# Patient Record
Sex: MALE | Race: WHITE | NOT HISPANIC OR LATINO | URBAN - METROPOLITAN AREA
[De-identification: names, ages, dates, MRNs, and addresses within clinical notes are randomized per-mention and may not be internally consistent; named-entity substitution may affect disease eponyms.]

---

## 2017-06-13 ENCOUNTER — OFFICE VISIT - HEALTHEAST (OUTPATIENT)
Dept: VASCULAR SURGERY | Facility: CLINIC | Age: 77
End: 2017-06-13

## 2017-06-13 DIAGNOSIS — L97.329 VENOUS STASIS ULCER OF ANKLE, LEFT (H): ICD-10-CM

## 2017-06-13 DIAGNOSIS — I83.023 VENOUS STASIS ULCER OF ANKLE, LEFT (H): ICD-10-CM

## 2017-06-20 ENCOUNTER — OFFICE VISIT - HEALTHEAST (OUTPATIENT)
Dept: VASCULAR SURGERY | Facility: CLINIC | Age: 77
End: 2017-06-20

## 2017-06-20 DIAGNOSIS — L97.329 VENOUS STASIS ULCER OF ANKLE, LEFT (H): ICD-10-CM

## 2017-06-20 DIAGNOSIS — I83.023 VENOUS STASIS ULCER OF ANKLE, LEFT (H): ICD-10-CM

## 2017-06-27 ENCOUNTER — OFFICE VISIT - HEALTHEAST (OUTPATIENT)
Dept: VASCULAR SURGERY | Facility: CLINIC | Age: 77
End: 2017-06-27

## 2017-06-27 DIAGNOSIS — I83.023 VENOUS STASIS ULCER OF ANKLE, LEFT (H): ICD-10-CM

## 2017-06-27 DIAGNOSIS — L97.329 VENOUS STASIS ULCER OF ANKLE, LEFT (H): ICD-10-CM

## 2017-07-11 ENCOUNTER — OFFICE VISIT - HEALTHEAST (OUTPATIENT)
Dept: VASCULAR SURGERY | Facility: CLINIC | Age: 77
End: 2017-07-11

## 2017-07-11 DIAGNOSIS — I83.023 VENOUS STASIS ULCER OF ANKLE, LEFT (H): ICD-10-CM

## 2017-07-11 DIAGNOSIS — L97.329 VENOUS STASIS ULCER OF ANKLE, LEFT (H): ICD-10-CM

## 2017-08-29 ENCOUNTER — OFFICE VISIT - HEALTHEAST (OUTPATIENT)
Dept: VASCULAR SURGERY | Facility: CLINIC | Age: 77
End: 2017-08-29

## 2017-08-29 DIAGNOSIS — L97.909 VENOUS STASIS ULCER OF LOWER EXTREMITY (H): ICD-10-CM

## 2017-08-29 DIAGNOSIS — I87.8 VENOUS STASIS: ICD-10-CM

## 2017-08-29 DIAGNOSIS — I83.009 VENOUS STASIS ULCER OF LOWER EXTREMITY (H): ICD-10-CM

## 2017-09-05 ENCOUNTER — OFFICE VISIT - HEALTHEAST (OUTPATIENT)
Dept: VASCULAR SURGERY | Facility: CLINIC | Age: 77
End: 2017-09-05

## 2017-09-05 DIAGNOSIS — L97.929 VENOUS STASIS ULCERS OF BOTH LOWER EXTREMITIES (H): ICD-10-CM

## 2017-09-05 DIAGNOSIS — I83.029 VENOUS STASIS ULCERS OF BOTH LOWER EXTREMITIES (H): ICD-10-CM

## 2017-09-05 DIAGNOSIS — I83.019 VENOUS STASIS ULCERS OF BOTH LOWER EXTREMITIES (H): ICD-10-CM

## 2017-09-05 DIAGNOSIS — L97.919 VENOUS STASIS ULCERS OF BOTH LOWER EXTREMITIES (H): ICD-10-CM

## 2017-09-08 ENCOUNTER — COMMUNICATION - HEALTHEAST (OUTPATIENT)
Dept: VASCULAR SURGERY | Facility: CLINIC | Age: 77
End: 2017-09-08

## 2017-10-10 ENCOUNTER — OFFICE VISIT - HEALTHEAST (OUTPATIENT)
Dept: VASCULAR SURGERY | Facility: CLINIC | Age: 77
End: 2017-10-10

## 2017-10-10 DIAGNOSIS — I83.019 VENOUS STASIS ULCERS OF BOTH LOWER EXTREMITIES (H): ICD-10-CM

## 2017-10-10 DIAGNOSIS — L97.919 VENOUS STASIS ULCERS OF BOTH LOWER EXTREMITIES (H): ICD-10-CM

## 2017-10-10 DIAGNOSIS — L97.929 VENOUS STASIS ULCERS OF BOTH LOWER EXTREMITIES (H): ICD-10-CM

## 2017-10-10 DIAGNOSIS — I83.029 VENOUS STASIS ULCERS OF BOTH LOWER EXTREMITIES (H): ICD-10-CM

## 2019-07-03 ENCOUNTER — COMMUNICATION - HEALTHEAST (OUTPATIENT)
Dept: VASCULAR SURGERY | Facility: CLINIC | Age: 79
End: 2019-07-03

## 2019-07-12 ENCOUNTER — RECORDS - HEALTHEAST (OUTPATIENT)
Dept: ADMINISTRATIVE | Facility: OTHER | Age: 79
End: 2019-07-12

## 2019-07-12 ENCOUNTER — RECORDS - HEALTHEAST (OUTPATIENT)
Dept: VASCULAR ULTRASOUND | Facility: CLINIC | Age: 79
End: 2019-07-12

## 2019-07-12 ENCOUNTER — COMMUNICATION - HEALTHEAST (OUTPATIENT)
Dept: VASCULAR SURGERY | Facility: CLINIC | Age: 79
End: 2019-07-12

## 2019-07-12 ENCOUNTER — OFFICE VISIT - HEALTHEAST (OUTPATIENT)
Dept: VASCULAR SURGERY | Facility: CLINIC | Age: 79
End: 2019-07-12

## 2019-07-12 DIAGNOSIS — E11.621 TYPE 2 DIABETES MELLITUS WITH FOOT ULCER (CODE) (H): ICD-10-CM

## 2019-07-12 DIAGNOSIS — L03.119 CELLULITIS AND ABSCESS OF FOOT: ICD-10-CM

## 2019-07-12 DIAGNOSIS — L97.524 NON-PRESSURE CHRONIC ULCER OF OTHER PART OF LEFT FOOT WITH NECROSIS OF BONE (H): ICD-10-CM

## 2019-07-12 DIAGNOSIS — E11.621 DIABETIC ULCER OF TOE OF LEFT FOOT ASSOCIATED WITH TYPE 2 DIABETES MELLITUS, WITH NECROSIS OF BONE (H): ICD-10-CM

## 2019-07-12 DIAGNOSIS — L02.619 CELLULITIS AND ABSCESS OF FOOT: ICD-10-CM

## 2019-07-12 DIAGNOSIS — L97.524 DIABETIC ULCER OF TOE OF LEFT FOOT ASSOCIATED WITH TYPE 2 DIABETES MELLITUS, WITH NECROSIS OF BONE (H): ICD-10-CM

## 2019-07-12 ASSESSMENT — MIFFLIN-ST. JEOR: SCORE: 1796.27

## 2019-07-17 ENCOUNTER — COMMUNICATION - HEALTHEAST (OUTPATIENT)
Dept: VASCULAR SURGERY | Facility: CLINIC | Age: 79
End: 2019-07-17

## 2019-07-18 ENCOUNTER — HOSPITAL ENCOUNTER (OUTPATIENT)
Dept: NUCLEAR MEDICINE | Facility: HOSPITAL | Age: 79
Discharge: HOME | End: 2019-07-18
Attending: PODIATRIST

## 2019-07-18 DIAGNOSIS — E11.621 DIABETIC ULCER OF TOE OF LEFT FOOT ASSOCIATED WITH TYPE 2 DIABETES MELLITUS, WITH NECROSIS OF BONE (H): ICD-10-CM

## 2019-07-18 DIAGNOSIS — L97.524 DIABETIC ULCER OF TOE OF LEFT FOOT ASSOCIATED WITH TYPE 2 DIABETES MELLITUS, WITH NECROSIS OF BONE (H): ICD-10-CM

## 2019-07-25 ENCOUNTER — HOSPITAL ENCOUNTER (OUTPATIENT)
Dept: NUCLEAR MEDICINE | Facility: HOSPITAL | Age: 79
Discharge: HOME | End: 2019-07-25
Attending: PODIATRIST

## 2019-07-25 ENCOUNTER — HOSPITAL ENCOUNTER (OUTPATIENT)
Dept: RADIOLOGY | Facility: HOSPITAL | Age: 79
Discharge: HOME | End: 2019-07-25
Attending: PODIATRIST

## 2019-07-25 DIAGNOSIS — E11.621 DIABETIC ULCER OF TOE OF LEFT FOOT ASSOCIATED WITH TYPE 2 DIABETES MELLITUS, WITH NECROSIS OF BONE (H): ICD-10-CM

## 2019-07-25 DIAGNOSIS — L97.524 DIABETIC ULCER OF TOE OF LEFT FOOT ASSOCIATED WITH TYPE 2 DIABETES MELLITUS, WITH NECROSIS OF BONE (H): ICD-10-CM

## 2019-07-26 ENCOUNTER — SURGERY - HEALTHEAST (OUTPATIENT)
Dept: PODIATRY | Facility: CLINIC | Age: 79
End: 2019-07-26

## 2019-07-26 ENCOUNTER — COMMUNICATION - HEALTHEAST (OUTPATIENT)
Dept: VASCULAR SURGERY | Facility: CLINIC | Age: 79
End: 2019-07-26

## 2019-07-26 ENCOUNTER — OFFICE VISIT - HEALTHEAST (OUTPATIENT)
Dept: VASCULAR SURGERY | Facility: CLINIC | Age: 79
End: 2019-07-26

## 2019-07-26 DIAGNOSIS — M86.9 OSTEOMYELITIS OF ANKLE AND FOOT (H): ICD-10-CM

## 2019-07-26 DIAGNOSIS — E11.621 DIABETIC ULCER OF TOE OF LEFT FOOT ASSOCIATED WITH TYPE 2 DIABETES MELLITUS, WITH NECROSIS OF BONE (H): ICD-10-CM

## 2019-07-26 DIAGNOSIS — L97.524 DIABETIC ULCER OF TOE OF LEFT FOOT ASSOCIATED WITH TYPE 2 DIABETES MELLITUS, WITH NECROSIS OF BONE (H): ICD-10-CM

## 2019-07-29 ENCOUNTER — RECORDS - HEALTHEAST (OUTPATIENT)
Dept: ADMINISTRATIVE | Facility: OTHER | Age: 79
End: 2019-07-29

## 2019-07-29 ENCOUNTER — COMMUNICATION - HEALTHEAST (OUTPATIENT)
Dept: VASCULAR SURGERY | Facility: CLINIC | Age: 79
End: 2019-07-29

## 2019-07-30 ASSESSMENT — MIFFLIN-ST. JEOR: SCORE: 1896.06

## 2019-07-31 ENCOUNTER — ANESTHESIA - HEALTHEAST (OUTPATIENT)
Dept: SURGERY | Facility: HOSPITAL | Age: 79
End: 2019-07-31

## 2019-07-31 ENCOUNTER — DOCUMENTATION ONLY (OUTPATIENT)
Dept: OTHER | Facility: CLINIC | Age: 79
End: 2019-07-31

## 2019-07-31 ENCOUNTER — SURGERY - HEALTHEAST (OUTPATIENT)
Dept: SURGERY | Facility: HOSPITAL | Age: 79
End: 2019-07-31

## 2019-07-31 ENCOUNTER — AMBULATORY - HEALTHEAST (OUTPATIENT)
Dept: OTHER | Facility: CLINIC | Age: 79
End: 2019-07-31

## 2019-08-02 ENCOUNTER — COMMUNICATION - HEALTHEAST (OUTPATIENT)
Dept: VASCULAR SURGERY | Facility: CLINIC | Age: 79
End: 2019-08-02

## 2019-08-07 ENCOUNTER — OFFICE VISIT - HEALTHEAST (OUTPATIENT)
Dept: VASCULAR SURGERY | Facility: CLINIC | Age: 79
End: 2019-08-07

## 2019-08-07 DIAGNOSIS — E11.621 DIABETIC ULCER OF TOE OF LEFT FOOT ASSOCIATED WITH TYPE 2 DIABETES MELLITUS, WITH NECROSIS OF BONE (H): ICD-10-CM

## 2019-08-07 DIAGNOSIS — L97.524 DIABETIC ULCER OF TOE OF LEFT FOOT ASSOCIATED WITH TYPE 2 DIABETES MELLITUS, WITH NECROSIS OF BONE (H): ICD-10-CM

## 2019-08-07 DIAGNOSIS — M86.172 OTHER ACUTE OSTEOMYELITIS OF LEFT FOOT (H): ICD-10-CM

## 2019-08-08 ENCOUNTER — COMMUNICATION - HEALTHEAST (OUTPATIENT)
Dept: VASCULAR SURGERY | Facility: CLINIC | Age: 79
End: 2019-08-08

## 2019-08-22 ENCOUNTER — OFFICE VISIT - HEALTHEAST (OUTPATIENT)
Dept: INFECTIOUS DISEASES | Facility: CLINIC | Age: 79
End: 2019-08-22

## 2019-08-22 ENCOUNTER — COMMUNICATION - HEALTHEAST (OUTPATIENT)
Dept: INFECTIOUS DISEASES | Facility: CLINIC | Age: 79
End: 2019-08-22

## 2019-08-22 DIAGNOSIS — M86.00 ACUTE HEMATOGENOUS OSTEOMYELITIS, UNSPECIFIED SITE (H): ICD-10-CM

## 2019-08-22 DIAGNOSIS — L97.524 DIABETIC ULCER OF TOE OF LEFT FOOT ASSOCIATED WITH TYPE 2 DIABETES MELLITUS, WITH NECROSIS OF BONE (H): ICD-10-CM

## 2019-08-22 DIAGNOSIS — E11.621 DIABETIC ULCER OF TOE OF LEFT FOOT ASSOCIATED WITH TYPE 2 DIABETES MELLITUS, WITH NECROSIS OF BONE (H): ICD-10-CM

## 2019-09-04 ENCOUNTER — OFFICE VISIT - HEALTHEAST (OUTPATIENT)
Dept: VASCULAR SURGERY | Facility: CLINIC | Age: 79
End: 2019-09-04

## 2019-09-04 DIAGNOSIS — L97.524 SKIN ULCER OF LEFT FOOT WITH NECROSIS OF BONE (H): ICD-10-CM

## 2019-09-04 ASSESSMENT — MIFFLIN-ST. JEOR: SCORE: 1873.38

## 2019-09-25 ENCOUNTER — OFFICE VISIT - HEALTHEAST (OUTPATIENT)
Dept: VASCULAR SURGERY | Facility: CLINIC | Age: 79
End: 2019-09-25

## 2019-09-25 DIAGNOSIS — L97.524 SKIN ULCER OF LEFT FOOT WITH NECROSIS OF BONE (H): ICD-10-CM

## 2019-09-25 ASSESSMENT — MIFFLIN-ST. JEOR: SCORE: 1873.38

## 2019-10-23 ENCOUNTER — OFFICE VISIT - HEALTHEAST (OUTPATIENT)
Dept: VASCULAR SURGERY | Facility: CLINIC | Age: 79
End: 2019-10-23

## 2019-10-23 DIAGNOSIS — L97.524 SKIN ULCER OF LEFT FOOT WITH NECROSIS OF BONE (H): ICD-10-CM

## 2019-10-23 ASSESSMENT — MIFFLIN-ST. JEOR: SCORE: 1873.38

## 2020-09-09 ENCOUNTER — OFFICE VISIT - HEALTHEAST (OUTPATIENT)
Dept: VASCULAR SURGERY | Facility: CLINIC | Age: 80
End: 2020-09-09

## 2020-09-09 DIAGNOSIS — L97.511 VENOUS STASIS ULCER OF OTHER PART OF RIGHT FOOT LIMITED TO BREAKDOWN OF SKIN WITH VARICOSE VEINS (H): ICD-10-CM

## 2020-09-09 DIAGNOSIS — L97.521 FOOT ULCERATION, LEFT, LIMITED TO BREAKDOWN OF SKIN (H): ICD-10-CM

## 2020-09-09 DIAGNOSIS — I83.015 VENOUS STASIS ULCER OF OTHER PART OF RIGHT FOOT LIMITED TO BREAKDOWN OF SKIN WITH VARICOSE VEINS (H): ICD-10-CM

## 2020-09-30 ENCOUNTER — OFFICE VISIT - HEALTHEAST (OUTPATIENT)
Dept: VASCULAR SURGERY | Facility: CLINIC | Age: 80
End: 2020-09-30

## 2020-09-30 DIAGNOSIS — Z86.718 HISTORY OF DVT (DEEP VEIN THROMBOSIS): ICD-10-CM

## 2020-09-30 DIAGNOSIS — I80.3 PHLEBITIS AND THROMBOPHLEBITIS OF LOWER EXTREMITIES: ICD-10-CM

## 2020-09-30 DIAGNOSIS — I87.8 VENOUS STASIS: ICD-10-CM

## 2020-09-30 DIAGNOSIS — I87.2 VENOUS INSUFFICIENCY OF BOTH LOWER EXTREMITIES: ICD-10-CM

## 2020-09-30 DIAGNOSIS — B95.8 STAPH INFECTION: ICD-10-CM

## 2020-09-30 DIAGNOSIS — I83.018: ICD-10-CM

## 2020-09-30 DIAGNOSIS — L90.5 SCAR CONDITION AND FIBROSIS OF SKIN: ICD-10-CM

## 2020-09-30 DIAGNOSIS — I83.023 VENOUS STASIS ULCER OF ANKLE, LEFT (H): ICD-10-CM

## 2020-09-30 DIAGNOSIS — L97.329 VENOUS STASIS ULCER OF ANKLE, LEFT (H): ICD-10-CM

## 2020-09-30 DIAGNOSIS — R60.9 DEPENDENT EDEMA: ICD-10-CM

## 2020-09-30 DIAGNOSIS — L97.812: ICD-10-CM

## 2020-10-01 ENCOUNTER — COMMUNICATION - HEALTHEAST (OUTPATIENT)
Dept: VASCULAR SURGERY | Facility: CLINIC | Age: 80
End: 2020-10-01

## 2020-10-03 LAB
BACTERIA SPEC CULT: ABNORMAL
GRAM STAIN RESULT: ABNORMAL
GRAM STAIN RESULT: ABNORMAL

## 2020-10-05 ENCOUNTER — COMMUNICATION - HEALTHEAST (OUTPATIENT)
Dept: VASCULAR SURGERY | Facility: CLINIC | Age: 80
End: 2020-10-05

## 2020-10-07 ENCOUNTER — COMMUNICATION - HEALTHEAST (OUTPATIENT)
Dept: VASCULAR SURGERY | Facility: CLINIC | Age: 80
End: 2020-10-07

## 2020-10-16 ENCOUNTER — COMMUNICATION - HEALTHEAST (OUTPATIENT)
Dept: VASCULAR SURGERY | Facility: CLINIC | Age: 80
End: 2020-10-16

## 2020-10-28 ENCOUNTER — OFFICE VISIT - HEALTHEAST (OUTPATIENT)
Dept: VASCULAR SURGERY | Facility: CLINIC | Age: 80
End: 2020-10-28

## 2020-10-28 DIAGNOSIS — L97.812: ICD-10-CM

## 2020-10-28 DIAGNOSIS — L97.329 VENOUS STASIS ULCER OF ANKLE, LEFT (H): ICD-10-CM

## 2020-10-28 DIAGNOSIS — I83.023 VENOUS STASIS ULCER OF ANKLE, LEFT (H): ICD-10-CM

## 2020-10-28 DIAGNOSIS — L97.511 VENOUS STASIS ULCER OF OTHER PART OF RIGHT FOOT LIMITED TO BREAKDOWN OF SKIN WITH VARICOSE VEINS (H): ICD-10-CM

## 2020-10-28 DIAGNOSIS — I83.015 VENOUS STASIS ULCER OF OTHER PART OF RIGHT FOOT LIMITED TO BREAKDOWN OF SKIN WITH VARICOSE VEINS (H): ICD-10-CM

## 2020-10-28 DIAGNOSIS — Z86.718 HISTORY OF DVT (DEEP VEIN THROMBOSIS): ICD-10-CM

## 2020-10-28 DIAGNOSIS — I87.8 VENOUS STASIS: ICD-10-CM

## 2020-10-28 DIAGNOSIS — I80.3 PHLEBITIS AND THROMBOPHLEBITIS OF LOWER EXTREMITIES: ICD-10-CM

## 2020-10-28 DIAGNOSIS — R60.9 DEPENDENT EDEMA: ICD-10-CM

## 2020-10-28 DIAGNOSIS — I83.018: ICD-10-CM

## 2020-10-28 DIAGNOSIS — L90.5 SCAR CONDITION AND FIBROSIS OF SKIN: ICD-10-CM

## 2020-10-28 DIAGNOSIS — I87.2 VENOUS INSUFFICIENCY OF BOTH LOWER EXTREMITIES: ICD-10-CM

## 2020-12-15 DIAGNOSIS — R80.1 PERSISTENT PROTEINURIA: Primary | ICD-10-CM

## 2021-05-30 NOTE — TELEPHONE ENCOUNTER
Dr. Vargas's office called back. Spoke to Charisse. She states that Dr. Vargas was notified and there will be no bridging needed since he held longer than 48hrs and pt should continue hold eliquis until surgery since it is in 2 days.

## 2021-05-30 NOTE — TELEPHONE ENCOUNTER
Phuong reached out to Dr. Vargas's about xarelto bridging prior to surgery on 7/31/19. Yuliana the triage nurse called and stated that Dr. Vargas is out today. She will send the message to another provider in the office. Phuong also stated that she has message to the pts cardiology.

## 2021-05-30 NOTE — TELEPHONE ENCOUNTER
Patient would like to see a provider for a wound on his foot sooner than 7/23/19.  Informed him of 7/19/19 opening but he is unable to make that.  Patient was put on a waiting list.  Pt stated understanding to go to the emergency room if he thinks the wound is getting worse.  Informed him to put a red marker around the wound site, if it increases he should go in.    Patient was put on an antibiotic by another provider for the wound starting on 7/3/19

## 2021-05-30 NOTE — TELEPHONE ENCOUNTER
Patient confirmed all testing @ [facility] for 7/18 and 7/22/19 mailed AVS and all info to patient

## 2021-05-30 NOTE — PATIENT INSTRUCTIONS - HE
Important Instructions     *     Stop downstairs to have an X-ray of your foot taken    * We will go over your test results at your next office visit    * make an appointment for your Bone Scan                      How to care for your wound    Cleanse wound with saline sent to you with your supplies or a wound wash found at the pharmacy.    *   Apply Silvadene & bacitracin to the wound    *   Cover the wound with a dry gauze dressing    *   Change dressing: every day    *   Do not get your ulcer wet when you shower or take a bath.  You can cover it with cling wrap, a bag taped to your skin or a cast protector.    *   Good nutrition is important for wound healing.  I recommend increasing your protein.  You can do this through your diet, nutritional supplements, and/or protein powder.      Please call [facility] before substituting any product    Call our clinic nurse at [phone] if there is an increase in drainage, pain, redness, or the wound size increases.  This maybe a sign of infection and require attention prior to the next appointment

## 2021-05-30 NOTE — TELEPHONE ENCOUNTER
Called patient and notified him of eliquis hold. Patient states that he has held medication since Friday-7/26. Surgery set for 7/31/19. He states that in the past he has had to hold his coumadin for 5 days prior to surgery and so he did the same for eliquis.     Notified PCP clinic. Spoke to Andres, triage nurse. Notified that patient has actually stopped eliquis for 4days now, unsure if they want to do a day of bridging or continue as is since surgery is in 2 days. Nurse will address this with Dr. Vargas.

## 2021-05-30 NOTE — TELEPHONE ENCOUNTER
Pt called with questions about tests. Reviewed everything with him.   Xray and Bone scan tomorrow and a 2nd bone scan on Monday.

## 2021-05-30 NOTE — PROGRESS NOTES
Surgery/Procedure: I&D with bone biopsy    Special Equipment: pulse lavage    Location: [facility]    Date: 7/31/19    Time: 10am    Surgeon: Dr. Cooper    Assist: none    Length of Surgery: 45 minutes    OR Confirmed/ :  Yes with Yesenia on 7/26/19    Orders In:  Yes    Provider Team Notified:  Yes    Entered on [employer] / [email or web address] Calendar:  Yes    Post Op: 8/7/19    Discussed with patient they he will need to hold Eliquis 48 hours before surgery. Called primary and Cardiology regarding any needs for bridging. Awaiting return call.

## 2021-05-30 NOTE — TELEPHONE ENCOUNTER
Primary clinic left message that patient can hold his eliquis for 48hrs prior to surgery and resume right after unless unusual bleeding is noted. Dr Ahsan Ramsey who was on-call approved this.     Writer left message to return phone call.

## 2021-05-30 NOTE — PROGRESS NOTES
FOOT AND ANKLE SURGERY/PODIATRY Progress Note      ASSESSMENT:   Osteomyelitis 2nd digit left foot  Ulceration 2nd digit left foot  DM2      TREATMENT:  -3 phase bone scan report indicates osteomyelitis of the 2nd digit left foot. Due to the presence of osteomyelitis, I reviewed the treatment options to include either 6 weeks IV antibiotics with Infectious Disease vs amputation of the involved bone. He would like to proceed with antibiotic treatment at this time. Discussed obtaining a bone biopsy in the OR setting with use of a wound vac after starting antibiotic therapy per ID. Also reviewed that amputation may be necessary if wound fails to resolve.     -After discussion of risk factors and consent obtained 2% Lidocaine HCL jelly was applied, under clean conditions, the left and foot ulceration(s) were debrided using currette.  Devitalized and nonviable tissue, along with any fibrin and slough, was removed to improve granulation tissue formation, stimulate wound healing, decrease overall bacteria load, disrupt biofilm formation and decrease edge senescence.  Total excisional debridement was 1.44 sq cm bone with a depth of 0.3 cm.   Ulcers were improved afterwards and .  Measures were as noted on the flow sheet. Patient tolerated this well. A gauze dressing was applied. He will continue to apply a gauze dressing qday.    -I will ask my office to coordinate surgery at [facility]. Pre-op physical with Dr. Vargas's office.       Matti Cooper, [facility]      HPI: Shreyas AJ Laura was seen again today for a sore on the 2nd digit left foot and to review bone scan report. He denies new concerns at this time.     Past Medical History:   Diagnosis Date     Aortic valve disorder 9/5/2014     Asthma 5/28/2015    Overview:  SIngulair controls , Advair  daily as needed      Atrial fibrillation (H) 10/13/2014     Cardiac pacemaker in situ 12/8/2014    Overview:  Date of last device in  office evaluation: 02/19/15 ? Type of device: pacemaker  ?  and model: [employer] [place]    * Date of implant: 10- ? Indication for device: tachy bladimir syndrome  ? Battery longevity documented as less than 3  Months: no ? Are any of the leads less than 3 months old: yes  ? Programming             ? Pacing mode and programmed lower rate:      Chronic kidney disease 9/5/2014     Edema 8/28/2007    Overview:  Bumex to control , urinary functions/      Essential hypertension 5/28/2015    Overview:  Monopril, Bumex 2mg BID   POTASSIUM (mmol/L)  Date Value  7/31/2014 4.2   8/29/2013 4.3   10/22/2012 4.6     CREATININE (mg/dl)  Date Value  7/31/2014 1.66*  8/29/2013 1.53*  10/22/2012 1.55*       Essential tremor 8/28/2007    Overview:  Chronic to neck, hands 2003      Mixed hyperlipidemia 5/28/2015    Overview:  Diet , fiber CV risks  Increased  LDL  Maximize  diet   recheck  over   3-4 months  cONSIDER  MEDS   LDL, CALC. (mg/dl)  Date Value  7/31/2014 125   8/29/2013 133*  10/22/2012 150*       Palpitations 9/25/2014     Peripheral vascular disease (H) 5/28/2015    Overview:  previous Fem/Pop bypass 1997      Phlebitis and thrombophlebitis of lower extremities 5/6/2006    Overview:  Recurrent DVT'sd lifelong Coumadin      Restless legs syndrome 6/29/2009    Overview:  Ongoing  problems , wakes  up  at [facility] . Hx  chronic  venous  insufficiency  and  recurrent DVT's  On Coumadin Life  long  Trial  Neurontin  300  at bedtime  to  start  the  2 at bedtime if  no better  then call  annd update  6/29/09      Sinoatrial node dysfunction (H) 10/13/2014    Overview:  S/p [employer] Dual Chamber pacemaker, 10/27/2014      Vitamin D deficiency 11/9/2010    Overview:  VITAMIN D, 25-OH, TOT (ng/mL)  Date Value  7/31/2014 38.0   8/29/2013 24.0*  2/14/2013 33.4        Takes  2000 IU  daily Update low  push to 50, 00  weekly   x  4   weeks  / repeat  over 3  months   rechecked   8/29/23013   Increase  to  " 61753  weekly   and  recheck over   3  months         No past surgical history on file.    Allergies   Allergen Reactions     Celecoxib Unknown     Codeine      Furosemide Myalgia     Latex Other (See Comments)     Tears skin off     Nsaids (Non-Steroidal Anti-Inflammatory Drug) Nausea And Vomiting     Opioids - Morphine Analogues Other (See Comments)     Tolmetin Other (See Comments)     Latex Other (See Comments)     Patient states it \"takes his skin off\"         Current Outpatient Medications:      acetaminophen (TYLENOL) 500 MG tablet, Take 500 mg by mouth every 6 (six) hours as needed for pain., Disp: , Rfl:      allopurinol (ZYLOPRIM) 100 MG tablet, , Disp: , Rfl:      amiodarone (PACERONE) 200 MG tablet, Take 200 mg by mouth daily., Disp: , Rfl: 0     apixaban (ELIQUIS) 5 mg Tab tablet, Take 5 mg by mouth., Disp: , Rfl:      aspirin 81 MG EC tablet, Take 81 mg by mouth., Disp: , Rfl:      bumetanide (BUMEX) 2 MG tablet, , Disp: , Rfl:      clotrimazole-betamethasone (LOTRISONE) cream, , Disp: , Rfl:      ergocalciferol (ERGOCALCIFEROL) 50,000 unit capsule, Take 50,000 Units by mouth., Disp: , Rfl:      gabapentin (NEURONTIN) 100 MG capsule, Take 100 mg by mouth., Disp: , Rfl:      gabapentin (NEURONTIN) 300 MG capsule, , Disp: , Rfl:      GLUC/MSM/C/BORON/MANGANESE/PRATIBHA (JOINT SUPPORT COMPLEX ORAL), Take 3 capsules by mouth once daily., Disp: , Rfl:      HYDROcodone-acetaminophen 5-325 mg per tablet, Take 1-2 tablets by mouth every 4 (four) hours as needed for pain., Disp: 30 tablet, Rfl: 0     lisinopril (PRINIVIL,ZESTRIL) 10 MG tablet, Take 10 mg by mouth daily., Disp: , Rfl: 3     lisinopril (PRINIVIL,ZESTRIL) 5 MG tablet, , Disp: , Rfl:      metoprolol succinate (TOPROL-XL) 100 MG 24 hr tablet, Take 100 mg by mouth., Disp: , Rfl:      montelukast (SINGULAIR) 10 mg tablet, , Disp: , Rfl:      nitroglycerin (NITROSTAT) 0.4 MG SL tablet, Place 0.4 mg under the tongue., Disp: , Rfl:      senna-docusate " (PERICOLACE) 8.6-50 mg tablet, Take 2 tablets by mouth., Disp: , Rfl:      silver sulfADIAZINE (SILVADENE) 1 % cream, Apply topically., Disp: , Rfl:      walker (ULTRA-LIGHT ROLLATOR) Misc, Walker with front wheels for home use., Disp: , Rfl:     Current Facility-Administered Medications:      lidocaine 2 % jelly (XYLOCAINE), , Topical, PRN, Chidi Barry MD     lidocaine 2 % jelly (XYLOCAINE), , Topical, Once, Matti Cooper DPM    Review of Systems - per HPI, all others negative      OBJECTIVE:  Vitals:    07/26/19 1404   BP: 130/74   Pulse: 68   Resp: 20   Temp: 98.4  F (36.9  C)     General appearance: Patient is alert and fully cooperative with history & exam.  No sign of distress is noted during the visit.   Vascular: Dorsalis pedis non-palpableLeft.  Dermatologic:    Wound 08/29/17 right lateral ankle (Active)   Pre Size Length 0 10/10/2017  9:00 AM   Pre Size Width 0 10/10/2017  9:00 AM   Pre Size Depth 0 10/10/2017  9:00 AM   Pre Total Sq cm 0 10/10/2017  9:00 AM   Prodcut Used Foam 10/10/2017  9:00 AM       VASC Wound 07/12/19 left 2nd toe (Active)   Pre Size Length 1.2 7/26/2019  2:00 PM   Pre Size Width 1.2 7/26/2019  2:00 PM   Pre Size Depth 0.2 7/26/2019  2:00 PM   Pre Total Sq cm 1.44 7/26/2019  2:00 PM   Undermined no 7/26/2019  2:00 PM   Tunneling no 7/26/2019  2:00 PM   Description slough,pink 7/26/2019  2:00 PM   Probes to bone at PIPJ 2nd digit left. No erythema.   Neurologic: Diminished to light touch Left.  Musculoskeletal: Contracted digits noted Left.    Imaging:     Nm Bone Scan 3 Phase    Result Date: 7/25/2019  EXAM: NM BONE SCAN 3 PHASE LOCATION: [facility] DATE/TIME: 7/25/2019 1:24 PM INDICATION: Osteomyelitis, foot, follow up osteomyelitis 2nd digit left foot COMPARISON: X-ray 07/25/2019, 07/03/2019 nuclear medicine Ceretec white blood cell study 07/18/2019. TECHNIQUE: 28 mCi of technetium 99m MDP were administered intravenously and planar images of both feet were  obtained in blood flow, blood pool, and delayed phases. FINDINGS: Flow images demonstrate increased blood flow to the left forefoot. Blood pool images demonstrate increased blood pool in the soft tissues of the left foot and specifically in the region of the left second toe. Delayed images demonstrate focal increased radiotracer activity in the left second toe which corresponds to activity on white blood cell study and is consistent with osteomyelitis. There is also focal radiotracer uptake in the first MTP joint and this corresponds to an area of developing osseous destruction or erosion within the medial head of the first metatarsal as demonstrated on x-ray but does not demonstrate radiotracer activity on white blood cell scan.     CONCLUSION: 1.  Findings consistent with osteomyelitis involving the second toe of the left foot. 2.  Area of ostial lysis or erosion within the medial head of the first metatarsal demonstrates abnormal radiotracer uptake on three-phase bone scan but is not associated with abnormal radiotracer uptake in white blood cell scan and is equivocal for osteomyelitis. An erosive process could have the same appearance. 3.  Degenerative radiotracer uptake right midfoot and forefoot.    Nm Ceretec Limited Wbc    Result Date: 7/18/2019  EXAM: NM CERETEC LIMITED WBC LOCATION: [facility] DATE/TIME: 7/18/2019 2:28 PM INDICATION: osteomyelitis 2nd digit left foot COMPARISON: Left foot radiographs from 7/3/2019 are reviewed. TECHNIQUE: 25.7 mCi technetium 99m Ceretec were used to label the patient's white blood cells, which were then administered intravenously. Two-hour delayed planar imaging of the feet was obtained. FINDINGS: Marked focal white blood cell accumulation in the region of the left second toe indicates infection, which could be cellulitis or osteomyelitis. Recommend proceeding to three-phase bone scan to distinguish the two. Right foot negative.     Xr Foot Left 3 Or More Vws  Standing    Result Date: 7/26/2019  Bony erosion medial 1st metatarsal head. No discrete bony destruction along the 2nd digit. Mild bunion deformity.     Us Arterial Pressures Legs Bilateral    Result Date: 7/15/2019  [facility] OUTPATIENT EXAM: RESTING ANKLE-BRACHIAL INDICES (ABIs) WITH SEGMENTAL ARTERIAL PRESSURES OF THE LOWER EXTREMITIES BILATERALLY INDICATION: Peripheral arterial disease. Nondiabetic. Previous smoker. Hypertension. COMPARISON: None. EDGAR FINDINGS: SEGMENTAL BP RIGHT (mmHg) Brachial: 180 High Thigh: >254; Index NC Low Thigh: 194; Index 1.08 Calf: 200; Index 1.11 Ankle (PT): >254; Index NC Ankle (DP): 145; Index 0.81 Digit: 141; Index 0.78 LEFT (mmHg) Brachial: 175 High Thigh: >254; Index NC Low Thigh: 195; Index 1.08 Calf: 138; Index 0.77 Ankle (PT): 118; Index 0.66 Ankle (DP): 149; Index 0.83 Digit: 68; Index 0.38     1. RIGHT LOWER EXTREMITY: EDGAR at rest is mildly diminished at 0.81. The digital pressure is 141 mm Hg which suggests adequate wound healing potential. The segmental arterial pressures are normal in the low thigh and calf without evidence of a hemodynamically significant lesion. Some segments are noncompressible and there is likely arterial calcification. The segmental waveforms are normal and overall there is no evidence of a high-grade stenosis or occlusion. 2. LEFT LOWER EXTREMITY: EDGAR at rest is mildly diminished at 0.83. The digital pressure is 66 mm Hg which suggests adequate wound healing potential. The segmental arterial pressures reveal a gradient between the low thigh and the calf raising the possibility of a stenosis in the popliteal segment. No additional segmental gradient. Some segments are noncompressible and there is likely arterial calcification. The segmental waveforms are normal.         Picture: None

## 2021-05-30 NOTE — PATIENT INSTRUCTIONS - HE
Surgery Information    Surgery Date TBD    Surgery Time TBD  ( Arrive at the hospital two hours prior to your surgery and 1 hour prior at the surgery center. Check in at the . The only exception is if you are scheduled for surgery at 7am, you should arrive at 5:30am)    IF YOU NEED TO RESCHEDULE OR CANCEL YOUR SURGERY FOR ANY REASON PLEASE CALL THE CLINIC AS SOON AS POSSIBLE -[phone].    Admission Type: Outpatient    Surgeon: Dr. Cooper    Surgery Procedure: Debridement of ulcer with bone biopsy    Surgery Location: [facility],[address] [place] [address], Main Admitting      Additional Information: If you use a CPAP machine for sleep apnea please bring it with you for surgery. We will want to monitor your breathing using your normal equipment.     HOSPITAL AND SURGERY CENTER INFORMATION    We need to know a lot of information about you before surgery.     1-5 Days Before:     A nurse will call you for a pre-screening interview. The phone call with the nurse will be much faster and easier if you  Have organized your information prior to the call.     Please have the following information available:    Preoperative Exam completed and faxed to our office    Primary care provider's and any specialty providers' contact information available. .    If requested by your primary care provider, have any heart and lung exams at least 3 days before surgery.    Have a complete and accurate list of medications available.     Have a list of your allergies/sensitivities and reactions    Know your health history, surgical history, and medical problems    Know any anesthesia issues with you or within your family.     BE SURE TO NOTIFY US IF YOU ARE TAKING ANY BLOOD THINNING AGENTS: Coumadin, Plavix, Aspirin, Xarelto, Eliquis    Someone planned to bring you and stay with you after the surgery    Day Before Surgery    1. STOP Smoking and Drinking: It is important to stop smoking and drinking at least 24 hours before surgery.  Smoking and drinking may cause complications in your recovery from anesthesia and may lengthen the healing process.    2. Pack for your hospital stay: If it will be required for you to stay at the hospital after surgery, bring personal items such as a robe, slippers, pajamas, additional clothing and toiletries. Don't forget:    List of medication    Eyeglass case or contact case with solution. You cannot wear contacts during surgery    Copies of your physical exam , lab results and EKG    Copy of Health Care Directives, Living Will or Power of     Insurance Cards    Photo ID    CPAP machine    3. NOTHING BY MOUTH 8 HOURS BEFORE. This includes gum, hard candy, water and mints. The only exception is if you have been instructed by your doctor to take your medications with sips of water. You may rinse your mouth or brush your teeth, but do not swallow water.     4. Remove Nail Polish  Day of Surgery    1. Medications- Take as indicated with sips of water     2. Wear comfortable loose fitting clothes. Wear your glasses-Not contacts. Do not wear jewelry and remove body piercing's. Surgery may be cancelled if they are not removed.     3. If same day surgery-Have a someone come with you to surgery that can help you understand the surgeon's instructions, drive you home and stay with you overnight the first night.     4. A nurse will call you at home within 72 hours following surgery to see how you are doing. Our nurses and doctors will discuss recovery with you.          Important Instructions     * make an appointment for your pre-op appointment                      How to care for your wound    Cleanse wound with saline sent to you with your supplies or a wound wash found at the pharmacy.    *   Cover the wound with a dry gauze dressing    *   Change dressing: every day    *   Do not get your ulcer wet when you shower or take a bath.  You can cover it with cling wrap, a bag taped to your skin or a cast  protector.    *   Good nutrition is important for wound healing.  I recommend increasing your protein.  You can do this through your diet, nutritional supplements, and/or protein powder.      Please call [facility] before substituting any product    Call our clinic nurse at [phone] if there is an increase in drainage, pain, redness, or the wound size increases.  This maybe a sign of infection and require attention prior to the next appointment

## 2021-05-31 NOTE — PATIENT INSTRUCTIONS - HE
Non-weight bearing left foot. Follow-up with Infectious Disease.     Apply a dry gauze dressing to the wound and cover with roll gauze and change every day.

## 2021-05-31 NOTE — ANESTHESIA CARE TRANSFER NOTE
Last vitals:   Vitals:    07/31/19 1048   BP: 152/68   Pulse: 62   Resp: 18   Temp: 36.2  C (97.1  F)   SpO2: 100%     Patient's level of consciousness is drowsy  Spontaneous respirations: yes  Maintains airway independently: yes  Dentition unchanged: yes  Oropharynx: oropharynx clear of all foreign objects    QCDR Measures:  ASA# 20 - Surgical Safety Checklist: WHO surgical safety checklist completed prior to induction    PQRS# 430 - Adult PONV Prevention: 4558F - Pt received => 2 anti-emetic agents (different classes) preop & intraop  ASA# 8 - Peds PONV Prevention: NA - Not pediatric patient, not GA or 2 or more risk factors NOT present  PQRS# 424 - Cindy-op Temp Management: 4559F - At least one body temp DOCUMENTED => 35.5C or 95.9F within required timeframe  PQRS# 426 - PACU Transfer Protocol: - Transfer of care checklist used  ASA# 14 - Acute Post-op Pain: ASA14B - Patient did NOT experience pain >= 7 out of 10

## 2021-05-31 NOTE — TELEPHONE ENCOUNTER
----- Message from Phuong Mora sent at 8/2/2019 10:23 AM CDT -----  Click on previous message and it will open up.    From: Karoline Otero CMA  Sent: 8/1/2019   1:49 PM  To: [facility] Support Pool    Wound VAC order has been placed. Please reach out to this patient and find out if he wants homecare initiated or if he wants to come in for wound vac changes. Please order homecare or have patient call to schedule nurse visits after the vac arrives. Thanks!    ----- Message -----  From: Concha Weeks, RN  Sent: 8/2/2019   9:45 AM  To: Phuong Mora    WHAT AM I FOLLOWING UP ON?  ----- Message -----  From: Phuong Mora  Sent: 8/2/2019   6:47 AM  To: Concha Weeks, RN    Please follow up on this. Thank you  ----- Message -----  From: Karoline Otero CMA  Sent: 8/1/2019   1:49 PM  To: [facility] Support Pool    Wound VAC order has been placed. Please reach out to this patient and find out if he wants homecare initiated or if he wants to come in for wound vac changes. Please order homecare or have patient call to schedule nurse visits after the vac arrives. Thanks!

## 2021-05-31 NOTE — ANESTHESIA PREPROCEDURE EVALUATION
Anesthesia Evaluation      Patient summary reviewed   No history of anesthetic complications     Airway   Mallampati: II   Pulmonary - normal exam   (+) asthma  mild, shortness of breath, sleep apnea on CPAP, ,                          Cardiovascular - normal exam  Exercise tolerance: > or = 4 METS  (+) pacemaker (ICDsino atria; node dysfunction), hypertension, dysrhythmias (afib), , hypercholesterolemia, PVD    ECG reviewed  Rhythm: regular  Rate: normal,         Neuro/Psych - negative ROS     Endo/Other    (+) diabetes mellitus type 2 well controlled, obesity,      GI/Hepatic/Renal    (+)   chronic renal disease,           Dental - normal exam                        Anesthesia Plan  Planned anesthetic: MAC  Propofol drip  ASA 4     Anesthetic plan and risks discussed with: patient    Post-op plan: routine recovery

## 2021-05-31 NOTE — TELEPHONE ENCOUNTER
[facility] called to get more information on patient, but was instructed to cancel they order since patient is refusing the wound vac.

## 2021-05-31 NOTE — TELEPHONE ENCOUNTER
Patient was called to discuss the wound vac - Home Care for wound vac changes vs come into the clinic 3x a week.    He was very confused, he stated this was the first time he had heard about the wound vac.  He does not want to make any decisions until he sees MD Cooper at the 8/7/19 appointment.  The patient indicated per MD Cooper he is to leave the current dressing on post surgery until he sees MD Cooper.

## 2021-05-31 NOTE — ANESTHESIA POSTPROCEDURE EVALUATION
Patient: Shreyas Sanchez  Incision and drainage left foot,  Exostectomy 2nd digit left foot,  Excisional Debridement ulceration 2nd digit left foot  Anesthesia type: MAC    Patient location: Phase II Recovery  Last vitals:   Vitals Value Taken Time   /63 7/31/2019 12:00 PM   Temp 36.2  C (97.1  F) 7/31/2019 10:48 AM   Pulse 67 7/31/2019 12:00 PM   Resp 20 7/31/2019 11:28 AM   SpO2 98 % 7/31/2019 12:00 PM     Post vital signs: stable  Level of consciousness: awake and responds to simple questions  Post-anesthesia pain: pain controlled  Post-anesthesia nausea and vomiting: no  Pulmonary: unassisted, return to baseline  Cardiovascular: stable and blood pressure at baseline  Hydration: adequate  Anesthetic events: no    QCDR Measures:  ASA# 11 - Cindy-op Cardiac Arrest: ASA11B - Patient did NOT experience unanticipated cardiac arrest  ASA# 12 - Cindy-op Mortality Rate: ASA12B - Patient did NOT die  ASA# 13 - PACU Re-Intubation Rate: ASA13B - Patient did NOT require a new airway mgmt  ASA# 10 - Composite Anes Safety: ASA10A - No serious adverse event    Additional Notes:

## 2021-06-01 NOTE — PROGRESS NOTES
FOOT AND ANKLE SURGERY/PODIATRY Progress Note      ASSESSMENT:   Osteomyelitis 2nd digit left foot  Ulceration 2nd digit left foot  DM2      TREATMENT:  -Ulceration on the 2nd digit left foot continues to have exposed bone. I again recommend use of a wound vac to assist with granulation tissue to cover exposed bone. Reviewed risks of not using the wound vac including prolonged healing, infection and need for amputation. The patient again declines use of the wound vac and understands associated risks. He would like to apply silvadene to the 2nd digit. Continue with antibiotics per ID.     -After discussion of risk factors and consent obtained 2% Lidocaine HCL jelly was applied, under clean conditions, the left and foot ulceration(s) were debrided using currette.  Devitalized and nonviable tissue, along with any fibrin and slough, was removed to improve granulation tissue formation, stimulate wound healing, decrease overall bacteria load, disrupt biofilm formation and decrease edge senescence.  Total excisional debridement was 0.77 sq cm bone with a depth of 0.3 cm.   Ulcers were improved afterwards and .  Measures were as noted on the flow sheet. Patient tolerated this well. A gauze dressing was applied.    -He will follow-up in 3 weeks.      Matti Cooper, [facility] [place]      HPI: Shreyas Sanchez was seen again today for a sore on the 2nd digit left foot. He is taking antibiotics per ID and has been applying silvadene daily. No new concerns.       Past Medical History:   Diagnosis Date     Aortic stenosis      Aortic valve disorder 9/5/2014     Asthma 5/28/2015    Overview:  SIngulair controls , Advair  daily as needed      Atrial fibrillation (H) 10/13/2014     Cardiac pacemaker in situ 12/8/2014    Overview:  Date of last device in office evaluation: 02/19/15 ? Type of device: pacemaker  ?  and model: [employer] [place]    * Date of implant: 10- ? Indication for  device: tachy bladimir syndrome  ? Battery longevity documented as less than 3  Months: no ? Are any of the leads less than 3 months old: yes  ? Programming             ? Pacing mode and programmed lower rate:      Chronic kidney disease 9/5/2014    stage 3     Edema 8/28/2007    Overview:  Bumex to control , urinary functions/      Essential hypertension 5/28/2015    Overview:  Monopril, Bumex 2mg BID   POTASSIUM (mmol/L)  Date Value  7/31/2014 4.2   8/29/2013 4.3   10/22/2012 4.6     CREATININE (mg/dl)  Date Value  7/31/2014 1.66*  8/29/2013 1.53*  10/22/2012 1.55*       Essential tremor 8/28/2007    Overview:  Chronic to neck, hands 2003      Gout      History of blood clots     h/o DVT     Hypercoagulable state (H)      Memory impairment     per preop H&P     Mixed hyperlipidemia 5/28/2015    Overview:  Diet , fiber CV risks  Increased  LDL  Maximize  diet   recheck  over   3-4 months  cONSIDER  MEDS   LDL, CALC. (mg/dl)  Date Value  7/31/2014 125   8/29/2013 133*  10/22/2012 150*       Palpitations 9/25/2014     Peripheral vascular disease (H) 5/28/2015    Overview:  previous Fem/Pop bypass 1997      Phlebitis and thrombophlebitis of lower extremities 5/6/2006    Overview:  Recurrent DVT'sd lifelong Coumadin      Restless legs syndrome 6/29/2009    Overview:  Ongoing  problems , wakes  up  at [facility] . Hx  chronic  venous  insufficiency  and  recurrent DVT's  On Coumadin Life  long  Trial  Neurontin  300  at bedtime  to  start  the  2 at bedtime if  no better  then call  annd update  6/29/09      Shortness of breath     with exertion     Sinoatrial node dysfunction (H) 10/13/2014    Overview:  S/p [employer] Dual Chamber pacemaker, 10/27/2014      Sleep apnea     does not use CPAP     Vitamin D deficiency 11/9/2010    Overview:  VITAMIN D, 25-OH, TOT (ng/mL)  Date Value  7/31/2014 38.0   8/29/2013 24.0*  2/14/2013 33.4        Takes  2000 IU  daily Update low  push to 50, 00  weekly   x  4   weeks  / repeat   "over 3  months   rechecked   8/29/23013   Increase  to  26326  weekly   and  recheck over   3  months         Past Surgical History:   Procedure Laterality Date     AORTIC VALVE REPLACEMENT       bilateral cataract extractions       CARDIAC PACEMAKER PLACEMENT  2015    [employer]     FEMORAL ARTERY - POPLITEAL ARTERY BYPASS GRAFT Left      INCISION AND DRAINAGE OF WOUND Left 7/31/2019    Procedure: Incision and drainage left foot,  Exostectomy 2nd digit left foot,  Excisional Debridement ulceration 2nd digit left foot;  Surgeon: Matti Cooper DPM;  [place] [facility] [place]  Service: Podiatry     JOINT REPLACEMENT       RENAL BIOPSY       right carotid endarterectomy  2017     right TKA  2019       Allergies   Allergen Reactions     Celecoxib Unknown     Codeine      Altered mental status     Furosemide Myalgia     Latex Other (See Comments)     Tears skin off     Nsaids (Non-Steroidal Anti-Inflammatory Drug) Nausea And Vomiting     Opioids - Morphine Analogues Other (See Comments)     Tolmetin Other (See Comments)     Latex Other (See Comments)     Patient states it \"takes his skin off\"         Current Outpatient Medications:      acetaminophen (TYLENOL) 500 MG tablet, Take 500 mg by mouth every 6 (six) hours as needed for pain., Disp: , Rfl:      albuterol (PROAIR HFA;PROVENTIL HFA;VENTOLIN HFA) 90 mcg/actuation inhaler, Inhale 2 puffs every 6 (six) hours as needed for wheezing., Disp: , Rfl:      allopurinol (ZYLOPRIM) 100 MG tablet, Take 100 mg by mouth daily.    , Disp: , Rfl:      amoxicillin-clavulanate (AUGMENTIN) 875-125 mg per tablet, Take 1 tablet by mouth 2 (two) times a day for 21 days., Disp: 42 tablet, Rfl: 0     apixaban (ELIQUIS) 5 mg Tab tablet, Take 5 mg by mouth 2 (two) times a day.    , Disp: , Rfl:      aspirin 81 MG EC tablet, Take 81 mg by mouth daily.    , Disp: , Rfl:      bumetanide (BUMEX) 2 MG tablet, Take 2 mg by mouth daily as needed.    , Disp: , Rfl:      clindamycin " (CLEOCIN) 300 MG capsule, Take 1 capsule (300 mg total) by mouth 3 (three) times a day for 21 days., Disp: 63 capsule, Rfl: 0     ergocalciferol (ERGOCALCIFEROL) 50,000 unit capsule, Take 50,000 Units by mouth once a week.    , Disp: , Rfl:      gabapentin (NEURONTIN) 100 MG capsule, Take 100 mg by mouth 2 (two) times a day. Take with 300 mg capsule., Disp: , Rfl:      gabapentin (NEURONTIN) 300 MG capsule, Take 300 mg by mouth 2 (two) times a day.    , Disp: , Rfl:      GLUC/MSM/C/BORON/MANGANESE/PRATIBHA (JOINT SUPPORT COMPLEX ORAL), Take 3 capsules by mouth once daily., Disp: , Rfl:      HYDROcodone-acetaminophen 5-325 mg per tablet, Take 1 tablet by mouth every 8 (eight) hours as needed for pain., Disp: , Rfl:      HYDROcodone-acetaminophen 5-325 mg per tablet, Take 1 tablet by mouth every 4 (four) hours as needed for pain., Disp: 30 tablet, Rfl: 0     metoprolol succinate (TOPROL-XL) 100 MG 24 hr tablet, Take 50 mg by mouth daily.    , Disp: , Rfl:      montelukast (SINGULAIR) 10 mg tablet, Take 10 mg by mouth at bedtime.    , Disp: , Rfl:      nitroglycerin (NITROSTAT) 0.4 MG SL tablet, Place 0.4 mg under the tongue every 5 (five) minutes as needed for chest pain.    , Disp: , Rfl:      senna-docusate (PERICOLACE) 8.6-50 mg tablet, Take 2 tablets by mouth 2 (two) times a day as needed for constipation.    , Disp: , Rfl:      silver sulfADIAZINE (SILVADENE) 1 % cream, Apply topically 2 (two) times a day as needed.    , Disp: , Rfl:      walker (ULTRA-LIGHT ROLLATOR) Misc, Walker with front wheels for home use., Disp: , Rfl:     Current Facility-Administered Medications:      lidocaine 2 % jelly (XYLOCAINE), , Topical, PRN, Chidi Barry MD     lidocaine 2 % jelly (XYLOCAINE), , Topical, Once, Matti Coopre DPM    Review of Systems - per HPI, all others negative      OBJECTIVE:  Vitals:    09/04/19 1226   BP: 122/62   Pulse: 68   Resp: 16   Temp: 98.2  F (36.8  C)     General appearance: Patient is alert  and fully cooperative with history & exam.  No sign of distress is noted during the visit.   Vascular: Dorsalis pedis non-palpableLeft.  Dermatologic:    VASC Wound 07/12/19 left 2nd toe (Active)   Pre Size Length 1.1 9/4/2019 12:00 PM   Pre Size Width 0.7 9/4/2019 12:00 PM   Pre Size Depth 0.1 9/4/2019 12:00 PM   Pre Total Sq cm 0.77 9/4/2019 12:00 PM   Undermined no 8/7/2019  2:00 PM   Tunneling no 8/7/2019  2:00 PM   Description slough 8/7/2019  2:00 PM   Prodcut Used Gauze 8/7/2019  2:00 PM       Wound 07/31/19 Non-pressure related ulcer Foot Right (Active)       Incision 07/31/19 Surgical Foot Left (Active)   Exposed bone along the dorsal ulceration 2nd digit left foot, granular tissue at peripheral wound. No erythema.   Neurologic: Diminished to light touch Left.  Musculoskeletal: Contracted digits noted Left.    Imaging:     No results found.       Picture: None

## 2021-06-01 NOTE — PATIENT INSTRUCTIONS - HE
Important Instructions     Limited walking left foot.                     How to care for your wound    Cleanse wound with saline sent to you with your supplies or a wound wash found at the pharmacy.    *   Apply Silvadene to the wound    *   Cover the wound with a dry gauze dressing    *   Change dressing: every day    *   Do not get your ulcer wet when you shower or take a bath.  You can cover it with cling wrap, a bag taped to your skin or a cast protector.    *   Good nutrition is important for wound healing.  I recommend increasing your protein.  You can do this through your diet, nutritional supplements, and/or protein powder.      Please call [facility] before substituting any product    Call our clinic nurse at [phone] if there is an increase in drainage, pain, redness, or the wound size increases.  This maybe a sign of infection and require attention prior to the next appointment

## 2021-06-01 NOTE — PATIENT INSTRUCTIONS - HE
Important Instructions                     How to care for your wound    Cleanse wound with saline sent to you with your supplies or a wound wash found at the pharmacy.    *   Cover the wound with silvadene     *   Cover the wound with a dry gauze dressing    *   Change dressing: every day    *   Do not get your ulcer wet when you shower or take a bath.  You can cover it with cling wrap, a bag taped to your skin or a cast protector.    *   Good nutrition is important for wound healing.  I recommend increasing your protein.  You can do this through your diet, nutritional supplements, and/or protein powder.      Please call [facility] before substituting any product    Call our clinic nurse at [phone] if there is an increase in drainage, pain, redness, or the wound size increases.  This maybe a sign of infection and require attention prior to the next appointment

## 2021-06-03 VITALS
DIASTOLIC BLOOD PRESSURE: 76 MMHG | TEMPERATURE: 98.3 F | RESPIRATION RATE: 16 BRPM | BODY MASS INDEX: 30.8 KG/M2 | HEART RATE: 84 BPM | SYSTOLIC BLOOD PRESSURE: 124 MMHG | WEIGHT: 240 LBS | HEIGHT: 74 IN

## 2021-06-03 VITALS — BODY MASS INDEX: 31.44 KG/M2 | WEIGHT: 245 LBS | HEIGHT: 74 IN

## 2021-06-03 VITALS
SYSTOLIC BLOOD PRESSURE: 122 MMHG | TEMPERATURE: 98.2 F | RESPIRATION RATE: 16 BRPM | DIASTOLIC BLOOD PRESSURE: 62 MMHG | WEIGHT: 240 LBS | BODY MASS INDEX: 30.8 KG/M2 | HEART RATE: 68 BPM | HEIGHT: 74 IN

## 2021-06-03 VITALS
HEART RATE: 80 BPM | RESPIRATION RATE: 16 BRPM | TEMPERATURE: 98.1 F | BODY MASS INDEX: 30.8 KG/M2 | HEIGHT: 74 IN | SYSTOLIC BLOOD PRESSURE: 138 MMHG | DIASTOLIC BLOOD PRESSURE: 84 MMHG | WEIGHT: 240 LBS

## 2021-06-03 VITALS — HEIGHT: 70 IN | WEIGHT: 237 LBS | BODY MASS INDEX: 33.93 KG/M2

## 2021-06-03 VITALS — BODY MASS INDEX: 30.81 KG/M2 | WEIGHT: 240 LBS

## 2021-06-04 VITALS
RESPIRATION RATE: 20 BRPM | BODY MASS INDEX: 32.87 KG/M2 | TEMPERATURE: 98.3 F | SYSTOLIC BLOOD PRESSURE: 138 MMHG | DIASTOLIC BLOOD PRESSURE: 64 MMHG | HEART RATE: 60 BPM | WEIGHT: 256 LBS

## 2021-06-05 VITALS
RESPIRATION RATE: 18 BRPM | TEMPERATURE: 98 F | SYSTOLIC BLOOD PRESSURE: 142 MMHG | WEIGHT: 251 LBS | DIASTOLIC BLOOD PRESSURE: 74 MMHG | BODY MASS INDEX: 32.23 KG/M2 | HEART RATE: 60 BPM

## 2021-06-05 VITALS
DIASTOLIC BLOOD PRESSURE: 80 MMHG | HEART RATE: 60 BPM | SYSTOLIC BLOOD PRESSURE: 160 MMHG | WEIGHT: 241.3 LBS | TEMPERATURE: 98.2 F | BODY MASS INDEX: 30.98 KG/M2 | RESPIRATION RATE: 20 BRPM

## 2021-06-11 NOTE — PROGRESS NOTES
HPI: Pt is here for follow up of a venous stasis pressure changes left lower leg.  2 layer dressing last week taken down today, decrease significantly in the depth of his wound decreased also swelling of his lower.  He is doing well. His appetite is good, and bowel function regular.  No fevers or chills. Ambulating without problems.       /80 (Patient Site: Right Arm, Patient Position: Sitting, Cuff Size: Adult Regular)  Pulse 80  Temp 97.9  F (36.6  C) (Oral)   Resp 18      EXAM: This is a  77 y.o. MAN in no distress  GENERAL: Appears well  CHEST clear  CVS S1S2 NSR  ABDOMEN: Soft, non-tender.   EXT: warm, moves without difficulty ulcer left medial leg 1.6 cm depth    Debridement:  After consent was obtained from the patient debridement with a curette in the ulcer which is about 1.6 cm in diameter with a depth of 1 mm.  Remove the fibrinous nonviable tissue from the wound down to good healthy bleeding tissue.    Assessment/Plan:     Shreyas Sanchez  is following up after placement of a 2 layer dressing stasis disease and debridement. Doing well.  Replace with 2 layer dressing and see him back again in 1 week return in 1 week or sooner if any problems.    Chidi Barry MD  [facility] Department of Surgery

## 2021-06-11 NOTE — PROGRESS NOTES
HPI: Pt is here for follow up of a venous stasis ulcer and changes of left lower leg.  He is doing well. His appetite is good, and bowel function regular.  No fevers or chills. Ambulating without problems.       /70 (Patient Site: Left Arm, Patient Position: Sitting, Cuff Size: Adult Large)  Pulse 72  Resp 18      EXAM: This is a  77 y.o. MAN in no distress  GENERAL: Appears well  CHEST clear  CVS S1S2 NSR  ABDOMEN: Soft, non-tender.   EXT: warm, moves without difficulty,ulcer filled in almost completely      Assessment/Plan:   1. Venous stasis ulcer of ankle, left  silvercel and guaze change every tow or three days with compression sock    - Change dressing    Follow up 2-3 weeks    Chidi Barry MD  [facility] Department of Surgery

## 2021-06-11 NOTE — PROGRESS NOTES
HPI: Pt is here for follow up of a venous stasis and pressure ulcers f his lower extremities.  He has an open area on his left ankle which is not healing.  Here for reevaluation.  He had closure in the past for the same issue. His appetite is good, and bowel function regular.  No fevers or chills. Ambulating without problems.       /82 (Patient Site: Left Arm, Patient Position: Sitting, Cuff Size: Adult Large)  Pulse 72  Temp 98.4  F (36.9  C) (Oral)   Resp 16      EXAM: This is a  77 y.o. MAN in no distress  GENERAL: Appears well  CHEST clear  CVS S1S2 NSR  ABDOMEN: Soft, non-tender. Ulcer of medial ankle 2.4 cm in diameter with fibrinous material and nonviable base.  EXT: warm, moves without difficulty    Procedure:  Consent obtained  Lidocaine jelly was placed in his leg which is 1% and set for at least 5 minutes for procedure.  Debridement sharply with a curette to remove the dead by nonviable tissue.  This was taken down to the fascial level this treatment got to get all healthy bleeding tissue and the wound looks quite clean upon completion.  Dressings at this time point would be a piece of alginate with a 2 layer dressing over the top of this.        Assessment/Plan:   1. Venous stasis ulcer of ankle, left  We did today will also treat with antibiotics for 10 days to clear up and is surrounding erythema and start wound care is in good compression by doing a 2 layer dressing over this freshly debrided wound    - cephalexin (KEFLEX) 250 MG capsule; Take 3 capsules (750 mg total) by mouth 2 (two) times a day.  Dispense: 20 capsule; Refill: 1  - Wound care    1 week    Chidi Barry MD  [facility] Department of Surgery

## 2021-06-11 NOTE — PROGRESS NOTES
Compression Applied to Bilateral  2-Layer Coban Lite: I Applied the inner foam layer with the foot dorsiflexed and started atthe base of the fifth metatarsal head. I Left the bottom of the heel exposed, and proceed by winding the foam up the leg using minimal overlap to just below the fibular head. I then applied the compression layer with the foot dorsiflexed and startingat the base of the fifth metatarsal head. I applied at full stretch and proceeded up the leg using 50% overlap. The bottom of the heel is covered with the compression layer up to the end at the fibular head just below the back of the knee and levelwith the top edge of the foam layer.  I gently pressed and conformed the entire surface of the system to ensurethat the two layers are firmly bound together

## 2021-06-11 NOTE — PROGRESS NOTES
Patient here for concerns of left foot swelling. Patient was last seen in 12/29/2015 for left medial ankle wound. Wound is still present and patient is currently using silvadene and gauze daily.

## 2021-06-11 NOTE — PROGRESS NOTES
Patient here for his 2 week left medial ankle wound. Patient currently using silvercell and bandaid changing daily.

## 2021-06-11 NOTE — PROGRESS NOTES
Patient here for his 1 week 2 layer follow-up. Patient has a left medial ankle wound which measures 1.3cm x1.0cm. We are currently using silvercel, visco paste and then 2 layer for the wound and swelling.

## 2021-06-11 NOTE — PROGRESS NOTES
HPI: Pt is here for follow up of a venous stasis ulcer.  He is doing well. His appetite is good, and bowel function regular.  No fevers or chills. Ambulating without problems.       There were no vitals taken for this visit.      EXAM: This is a  77 y.o. MAN in no distress  GENERAL: Appears well  CHEST clear  CVS S1S2 NSR  ABDOMEN: Soft, non-tender.   EXT: warm, moves without difficulty, medial ulcer on the left fibrinous material   Consented and debrided today sharply to remove the nonviable tissue into the subcutaneous layer. 2cm squared total area      Assessment/Plan:   1. Venous stasis ulcer of ankle, left  debrided  Continue dressing changes    Return in about 4 weeks (around 8/8/2017).    Chidi Barry MD  [facility] Department of Surgery

## 2021-06-11 NOTE — TELEPHONE ENCOUNTER
Manav called to get a message to Dr. Cooper that he would like a refill on his pain medication. He was in to see Mouna Gerard CNP yesterday for wound debridement and he is having some pain in the areas of the wounds today but she did not prescribe pain medication.     Spoke to Dr. Cooper and he is unable to prescribe more pain medication for localized wound pain. Advised to take Tylenol or Ibuprofen and elevate when able. If this is not effective for him he should call his PCP.

## 2021-06-12 NOTE — TELEPHONE ENCOUNTER
Patient's home care nurse ([phone]) called with concern that bilateral 2 layer lite compression wrap not staying up. She is wondering instead of foam layer, they can try roll gauze with coban. Also looking for recommendations on itching legs.

## 2021-06-12 NOTE — TELEPHONE ENCOUNTER
----- Message from Mouna Gerard NP sent at 10/5/2020 12:30 PM CDT -----  Can you call the patient and let him know that his culture grew out staph; will treat with doxycyline 100mg two times a day for 10 days avoid dairy and the sun    Patient can also  some probiotics such as Culturelle to help prevent Antibiotic associated diarrhea. They can take this 1 hour before or 2 hours after taking their antibiotic. Should not be taken at the same time as they can cancel out the effects.

## 2021-06-12 NOTE — PROGRESS NOTES
Patient would not allow me to use roll gauze to adhere the silvercel and square gauze to his left shin and right lateral ankle. He insisted I tape the gauze to his leg. He stated that his compression socks would not go on if I used roll gauze. After taping the gauze on the patient, he still refused to let me apply his compression. Notified Mouna Gerard, CNP

## 2021-06-12 NOTE — TELEPHONE ENCOUNTER
Updated nurse, she will keep our office informed. Gentamicin is already being used. Drainage is not bright green. She will call with changes.

## 2021-06-12 NOTE — TELEPHONE ENCOUNTER
Writer left a message for Quita to call back. Requested that she confirm pt PCP with pt. Attempted to call x2 with no answer, also unable to speak to a representative.     Need to update PCP on BP and non-complice with diuretics.     Updated Quita that the swelling needs to be reduced before getting him into compression garments.

## 2021-06-12 NOTE — TELEPHONE ENCOUNTER
Left message for Venu- gave OK for all orders. Told to call back with any further questions. Next f/u with Mouna 10/28.

## 2021-06-12 NOTE — TELEPHONE ENCOUNTER
Quita from [facility] is calling 1). stating patient is being non complaint with lymphemia wraps she is wondering if the compression stockings will work. 2). He is also is not taking his Bumex as prescribed, (he's taking is once an a while). 3).  and lastly his blood pressure is elevated 196/88 and on the 13 is was 160/84.  He is reporting that he is complaint with his other medications.  If there are and changes please call the patient or Qutia at [phone]

## 2021-06-12 NOTE — TELEPHONE ENCOUNTER
Rodger  nurse calling for orders.     Skilled nursing 2x week for 4 weeks.   OT lymphedema therapy eval and treat.   Continued wound care orders from 9/30/20    Please advise.

## 2021-06-12 NOTE — PROGRESS NOTES
HPI: Pt is here for follow up of a replacement last week.  Today.  He states that he is having more pain more specifically on the right than the left.  He is doing well. His appetite is good, and bowel function regular.  No fevers or chills. Ambulating without problems.       There were no vitals taken for this visit.      EXAM: This is a  77 y.o. MAN in no distress  GENERAL: Appears well  CHEST clear  CVS S1S2 NSR  ABDOMEN: Soft, non-tender.   EXT: warm, moves without difficulty, pressure ulcers right side is lateral left side is medial.  Very shallow and very small measuring 2 x 3 mm clean granulation base.      Assessment/Plan:   1. Venous stasis ulcers of both lower extremities  At this time point with Citrobacter dressing compression socks  We will be in 2 weeks time.    Return in about 2 weeks (around 9/19/2017).  Chidi Barry MD  [facility] Department of Surgery

## 2021-06-12 NOTE — PROGRESS NOTES
HPI: Pt is here for follow up of a left venous stasis ulcer, now with open area on right. Otherwise doing ok.  He is doing well. His appetite is good, and bowel function regular.  No fevers or chills. Ambulating without problems.       /64 (Patient Site: Left Arm, Patient Position: Sitting, Cuff Size: Adult Large)  Pulse 76  Resp 18      EXAM: This is a  77 y.o. MAN in no distress  GENERAL: Appears well  CHEST clear  CVS S1S2 NSR  ABDOMEN: Soft, non-tender.   EXT: warm, moves without difficulty, bilateral stasis ulcers left greater then right      Assessment/Plan:   Venous stasis ulcers  vicopaste and two layer pressing  rtc in one week    Chidi Barry MD  [facility] Department of Surgery

## 2021-06-13 NOTE — PROGRESS NOTES
HPI: Pt is here for follow up of a bilateral stasis ulcers.  He is doing well. His appetite is good, and bowel function regular.  No fevers or chills. Ambulating without problems.       There were no vitals taken for this visit.      EXAM: This is a  77 y.o. MAN in no distress  GENERAL: Appears well  CHEST clear  CVS S1S2 NSR  ABDOMEN: Soft, non-tender.   EXT: warm, moves without difficulty      Assessment/Plan:   1. Venous stasis ulcers of both lower extremities  Wounds are all healed at this time point.  I discontinue to cover that right wound which is lateral for another couple weeks just to make sure that he does not breakdown again.  Discussed continue wearing socks and compression he is adamant about doing that and does not a regular basis.  We will see him back as needed    Return if symptoms worsen or fail to improve.    Chidi Barry MD  [facility] Department of Surgery

## 2021-06-16 PROBLEM — M86.9 OSTEOMYELITIS (H): Status: ACTIVE | Noted: 2019-07-26

## 2021-06-17 NOTE — PATIENT INSTRUCTIONS - HE
Patient Instructions by Matti Cooper DPM at 10/23/2019 12:00 PM     Author: Matti Cooper DPM Service: -- Author Type: Physician    Filed: 10/23/2019 12:37 PM Encounter Date: 10/23/2019 Status: Signed    : Matti Cooper DPM (Physician)       Limited walking left foot.     - Dressing Application of  Endoform    1. Endoform should be cut to the size of the wound.  It should touch the edges of the ulcer. It is okay if it overlap the edges a small amount.  Then, moisten the Endoform with saline.(If it is easier for you, you can moisten it before laying it in the wound)    2. Cover the wound with Endoform, followed by dry gauze, and secure with roll gauze if needed.     3. Endoform is naturally used by the body over time so you may not find it in the wound when you change your dressing.  If you do find some, gently cleanse the wound with saline. Do not remove the remaining Endoform, which may appear as an off-white to kuhn gel, just add Endoform on top.  Usually, more Endoform will need to be added every 5-7 days.     4. Change your top dressing every 1-2 days or as needed depending on drainage.    -Endoform is a collagen dressing created from ovine (sheep) fore-stomach tissue. The collagen extracellular matrix transforms into a soft conforming sheet, which is naturally incorporated into the wound over time.  Collagen dressings are used to stimulate wound healing.   ,

## 2021-06-18 NOTE — PATIENT INSTRUCTIONS - HE
Patient Instructions by Mouna Gerard NP at 10/28/2020  1:20 PM     Author: Mouna Gerard NP Service: -- Author Type: Nurse Practitioner    Filed: 10/28/2020  1:41 PM Encounter Date: 10/28/2020 Status: Signed    : Mouna Gerard NP (Nurse Practitioner)         Apply to dry, thickened; scaling areas 1-2 times per day      Continue home care    We will get you scheduled for EDGAR to check the arteries in your legs    We will get you scheduled for venous insufficiency to check the valves in the veins in your legs      Wound Care Instructions    Twice per week home care will Cleanse your bilateral foot and leg wound(s) with Dilute hibiclens 30cc in 500cc NS    Pat Dry with non-sterile gauze    Apply Lotion to the intact skin surrounding your wound and other dry skin locations. Some good lotions include: Remedy Skin Repair Cream, Sarna, Vanicream or Cetaphil    Apply am lactin lotion to the bottoms of the feet; pt needs to purchase this    Apply mixture of gentmycin and mupirocin ointment into/onto the wounds    Cover with silvercel    Secure with non-sterile roll gauze and tape as needed; avoid adhesive directly on the skin    Compression: compression stockings bilaterally    Elevate your legs throughout the day and night    Focus on protein in the diet; low sodium diet    It is not ok to get your wound wet in the bath or shower    SEEK MEDICAL CARE IF:    You have an increase in swelling, pain, or redness around the wound.    You have an increase in the amount of pus coming from the wound.    There is a bad smell coming from the wound.    The wound appears to be worsening/enlarging    You have a fever greater than 101.5 F        It is ok to continue current wound care treatment/products for the next 2-3 days until new wound care supplies are ordered and arrive. If longer than this please contact our office at [phone].      Mouna Gerard DNP, RN, CNP, CWN  [facility]  [place]  [phone]    It is recommended that you do not get your ulcer wet when showering.  Listed below are several ways of keeping it dry when you shower.     1. Wrap it with Press and Seal plastic wrap.  It can be found in the stores where the plastic wraps or tin foil is kept.    2.  Some people take a bath and hang their leg/foot out of the tub.    3  Put your leg in a plastic bag and tape it on.         4. You can purchase a shower cover for casts at some pharmacies and through the Internet.          High Protein Foods  Chicken  -Chicken breast, 3.5oz.-30 grams protein  -Chicken thigh-10 grams(average size)  -Drumstick-11 grams  -Wing- 6 grams  -Chicken meat, cooked, 4 oz.  Beef  -Hamburger lianna, 4 oz-28 grams protein  -Steak, 6 oz-42 grams  -Most cuts of beef- 7 grams of protein per ounce  Fish  -Most fish fillets or steaks are about 22 grams of protein for 3 1/2 oz(100 grams) of cooked fish, or 6 grams per ounce  -Tuna, 6 oz can-40 grams of protein  Pork  -Pork chop, average-22 grams protein  -Pork loin or tenderloin, 4 oz.-29 grams  -Ham, 3oz serving- 19 grams  -Ground pork 3oz cooked-22 grams  -Sullivan, 1 slice-3 grams  -[place]-style sullivan(black sullivan), slice-5-6 grams  Eggs and Dairy  -Egg, large-7 grams  -Milk, 1 cup-8 grams  -Cottage cheese, 1/2 cup-15 grams  -Greek yogurt, 1 cup-usually 8-12 grams, check label  -Soft cheeses (Mozzarella, Brie, Camembert)- 6 grams  -Medium cheeses(cheddar, swiss)- 7 or 8 grams per oz  -Hard cheeses(parmesan)- 10 grams per oz  Beans  -Tofu, 1/2 cup 20 grams  -Tofu, 1 oz., 2.3 grams  -Soy milk, 1 cup-6-10 grams  -Most beans(black, heart, lentils, etc.) about 7-10 grams protein per half cup of cooked beans  -soy beans, 1/2 cup cooked-14 grams  -Split peas, 1/2 cup cooked- 8 grams  Nuts and Seeds  -Peanut butter, 2 Tablespoons- 8 grams protein  -Almonds, 1/4 cup- 8 grams  -Peanuts, 1/4 cup-9 grams  -Cashews, 1/4 cup- 5 grams  -Pecans, 1/4 cup- 2.5 grams  -Sunflower  seeds, 1/4 cup- 6 grams  -Pumpkin seeds, 1/4 cup-8 grams  -Flax seeds- 1/4 cup- 8 grams  Protein Supplements  -Ensure  -Boost  -Glucerna, if diabetic  When you have an open ulcer, your bodies protein needs are much higher, so it is recommended eat good sources of protein

## 2021-06-18 NOTE — PATIENT INSTRUCTIONS - HE
Patient Instructions by Leeanne Gomez RN at 9/9/2020  3:20 PM     Author: Leeanne Gomez RN Service: -- Author Type: Registered Nurse    Filed: 9/9/2020  4:32 PM Encounter Date: 9/9/2020 Status: Signed    : Leeanne Gomez RN (Registered Nurse)       Limited walking.    Start endoform on left lateral foot ulcer:       Dressing Application     1. Endoform should be cut to the size of the wound.  It should touch the edges of the ulcer.  It can overlap the edges just very small amount.  Then, moisten with saline. (If it is easier for you, you can moisten it before laying it in the wound)    2. Cover the top of the wound with dry gauze.  Secure with roll gauze and paper tape.  No tape directly on skin.    3. Endoform is naturally used by the body over time so you may not find it in the wound when you change your dressing.  If you do find some, gently cleanse the wound with saline. Do not remove the remaining Endoform, which may appear as an off-white to kuhn gel, just add Endoform on top.  Usually, more Endoform will need to be added every 5-7 days.     4.  Change your top dressing every 1-2 days depending on drainage.     -Endoform is a collagen dressing created from ovine (sheep) fore-stomach tissue. The collagen extracellular matrix transforms into a soft conforming sheet, which is naturally incorporated into the wound over time.  Collagen dressings are used to stimulate wound healing.           Start Santyl on right lateral foot/ankle:    How to Use Collagenase  SANTYL  Ointment    DAILY CLEANSE  Gently cleanse the wound with sterile saline    APPLY  Apply SANTYL  Ointment at the thickness of a nickel or the thickness of the cream inside an Oreo cookie    COVER  Cover the wound with a clean moistened gauze followed by dry gauze if wound bed is dry. Wounds with sufficient exudate will naturally activate the collagenase enzyme and do not need a moistened gauze applied. Do not use dressings  that contain silver or iodine with SANTYL  Ointment, as they may stop SANTYL  Ointment

## 2021-06-18 NOTE — PATIENT INSTRUCTIONS - HE
Patient Instructions by Mouna Gerard NP at 9/30/2020  2:40 PM     Author: Mouna Gerard NP Service: -- Author Type: Nurse Practitioner    Filed: 9/30/2020  3:13 PM Encounter Date: 9/30/2020 Status: Addendum    : Mouna Gerard NP (Nurse Practitioner)    Related Notes: Original Note by Mouna Gerard NP (Nurse Practitioner) filed at 9/30/2020  3:11 PM       Obtained culture today this will take 5 days to get results we will call you with this    We will start home care    Go to pharmacy and  x3 ointments    We will get you scheduled for EDGAR to check the arteries in your legs    We will get you scheduled for venous insufficiency to check the valves in the veins in your legs      Wound Care Instructions    Twice per week home care will Cleanse your bilateral foot and leg wound(s) with Dilute hibiclens 30cc in 500cc NS    Pat Dry with non-sterile gauze    Apply Lotion to the intact skin surrounding your wound and other dry skin locations. Some good lotions include: Remedy Skin Repair Cream, Sarna, Vanicream or Cetaphil    Apply Triamcinolone ointment to the rash surrounding the right ankle wound    Apply mixture of gentmycin and mupirocin ointment into/onto the wounds    Cover with silvercel    Secure with non-sterile roll gauze and tape as needed; avoid adhesive directly on the skin    Compression: 2 layer lites bilaterally    Elevate your legs throughout the day and night    Focus on protein in the diet; low sodium diet    It is not ok to get your wound wet in the bath or shower    SEEK MEDICAL CARE IF:    You have an increase in swelling, pain, or redness around the wound.    You have an increase in the amount of pus coming from the wound.    There is a bad smell coming from the wound.    The wound appears to be worsening/enlarging    You have a fever greater than 101.5 F        It is ok to continue current wound care treatment/products for the next 2-3 days until new wound care supplies  are ordered and arrive. If longer than this please contact our office at [phone].      Mouna Gerard DNP, RN, CNP, [facility]  [phone]    It is recommended that you do not get your ulcer wet when showering.  Listed below are several ways of keeping it dry when you shower.     1. Wrap it with Press and Seal plastic wrap.  It can be found in the stores where the plastic wraps or tin foil is kept.    2.  Some people take a bath and hang their leg/foot out of the tub.    3  Put your leg in a plastic bag and tape it on.         4. You can purchase a shower cover for casts at some pharmacies and through the Internet.          High Protein Foods  Chicken  -Chicken breast, 3.5oz.-30 grams protein  -Chicken thigh-10 grams(average size)  -Drumstick-11 grams  -Wing- 6 grams  -Chicken meat, cooked, 4 oz.  Beef  -Hamburger lianna, 4 oz-28 grams protein  -Steak, 6 oz-42 grams  -Most cuts of beef- 7 grams of protein per ounce  Fish  -Most fish fillets or steaks are about 22 grams of protein for 3 1/2 oz(100 grams) of cooked fish, or 6 grams per ounce  -Tuna, 6 oz can-40 grams of protein  Pork  -Pork chop, average-22 grams protein  -Pork loin or tenderloin, 4 oz.-29 grams  -Ham, 3oz serving- 19 grams  -Ground pork 3oz cooked-22 grams  -Sullivan, 1 slice-3 grams  -[place]-style sullivan(black sullivan), slice-5-6 grams  Eggs and Dairy  -Egg, large-7 grams  -Milk, 1 cup-8 grams  -Cottage cheese, 1/2 cup-15 grams  -Greek yogurt, 1 cup-usually 8-12 grams, check label  -Soft cheeses (Mozzarella, Brie, Camembert)- 6 grams  -Medium cheeses(cheddar, swiss)- 7 or 8 grams per oz  -Hard cheeses(parmesan)- 10 grams per oz  Beans  -Tofu, 1/2 cup 20 grams  -Tofu, 1 oz., 2.3 grams  -Soy milk, 1 cup-6-10 grams  -Most beans(black, heart, lentils, etc.) about 7-10 grams protein per half cup of cooked beans  -soy beans, 1/2 cup cooked-14 grams  -Split peas, 1/2 cup cooked- 8 grams  Nuts and Seeds  -Peanut butter, 2  Tablespoons- 8 grams protein  -Almonds, 1/4 cup- 8 grams  -Peanuts, 1/4 cup-9 grams  -Cashews, 1/4 cup- 5 grams  -Pecans, 1/4 cup- 2.5 grams  -Sunflower seeds, 1/4 cup- 6 grams  -Pumpkin seeds, 1/4 cup-8 grams  -Flax seeds- 1/4 cup- 8 grams  Protein Supplements  -Ensure  -Boost  -Glucerna, if diabetic  When you have an open ulcer, your bodies protein needs are much higher, so it is recommended eat good sources of protein

## 2021-06-20 NOTE — LETTER
Letter by Matti Cooper DPM at      Author: Matti Cooper DPM Service: -- Author Type: --    Filed:  Encounter Date: 2020 Status: (Other)       2020    [facility]  Fax: [phone] Wound Dressing Rx and Order Form  Customer Service: [phone] Order Status: New Order   Verbal: Dr. Cooper/Leeanne Gomez RN            Patient Info:  Name: Shreyas Sanchez  : 1940  Address:   [address] [place] [id number]  Phone: [phone]      Insurance Info:  Primary: Payor: MEDICARE / Plan: MEDICARE A AND B / Product Type: Medicare /    Secondary: AARP AARP  [id number] - (Medicare)      Physician Info:   Name:  Matti Cooper JR., DPM   Dept Address/Phones:   [address] [place] [address], [facility] [address]  [facility] [address] [phone]  Fax: [phone]    Wound info:  Encounter Diagnoses   Name Primary?   ? Venous stasis ulcer of other part of right foot limited to breakdown of skin with varicose veins (H) Yes   ? Foot ulceration, left, limited to breakdown of skin (H)      VASC Wound 19 left 2nd toe (Active)   Pre Size Length 1 10/23/19 1200   Pre Size Width 0.5 10/23/19 1200   Pre Size Depth 0.1 10/23/19 1200   Pre Total Sq cm 0.5 10/23/19 1200   Undermined no 19 1400   Tunneling no 19 1400   Description slough 19 1400   Prodcut Used Gauze 19 1400       VASC Wound 20 right lateral foot/ankle (Active)   Pre Size Length 4 20 1614   Pre Size Width 3.1 20 1614   Pre Size Depth 0.1 20 1614   Pre Total Sq cm 12.4 20 1614   Prodcut Used Gauze 20 1614       VASC Wound 20 left lateral foot (Active)   Pre Size Length 0.3 20 1614   Pre Size Width 0.5 20 1614   Pre Size Depth 0.1 20 1614   Pre Total Sq cm 0.15 20 1614   Post Size Length 0.2 20 161   Post Size Width 0.6 20 1614   Post Size Depth 0.1 20 1614   Post Total Sq cm 0.12 204        Wound 07/31/19 Non-pressure related ulcer Foot Right (Active)       Incision 07/31/19 Surgical Foot Left (Active)     Start endoform on left lateral foot ulcer:     Dressing Application      1. Endoform should be cut to the size of the wound.  It should touch the edges of the ulcer.  It can overlap the edges just very small amount.  Then, moisten with saline. (If it is easier for you, you can moisten it before laying it in the wound)     2. Cover the top of the wound with dry gauze.  Secure with roll gauze and paper tape.  No tape directly on skin.     3. Endoform is naturally used by the body over time so you may not find it in the wound when you change your dressing.  If you do find some, gently cleanse the wound with saline. Do not remove the remaining Endoform, which may appear as an off-white to kuhn gel, just add Endoform on top.  Usually, more Endoform will need to be added every 5-7 days.      4.  Change your top dressing every 1-2 days depending on drainage.      -Endoform is a collagen dressing created from ovine (sheep) fore-stomach tissue. The collagen extracellular matrix transforms into a soft conforming sheet, which is naturally incorporated into the wound over time.  Collagen dressings are used to stimulate wound healing.               Start Santyl on right lateral foot/ankle:     How to Use Collagenase  SANTYL  Ointment     DAILY CLEANSE  Gently cleanse the wound with sterile saline     APPLY  Apply SANTYL  Ointment at the thickness of a nickel or the thickness of the cream inside an Oreo cookie     COVER  Cover the wound with a clean moistened gauze followed by dry gauze if wound bed is dry. Wounds with sufficient exudate will naturally activate the collagenase enzyme and do not need a moistened gauze applied. Do not use dressings that contain silver or iodine with SANTYL  Ointment, as they may stop SANTYL  Ointment    Drainage: Moderate  Thickness:  Full  Duration of Need: 60  Days Supply:  30  Start Date: 09/09/20  Starter Kit: Ancillary Kit (saline, gloves, gauze)  Qualifying wound/Debridement Yes      Dressing Type Brand Size Number of pieces Frequency of change   Primary Gauze  4x4 2 loafs Daily                                           Secondary Stretch Roll gauze  4x75 60 Daily                                   Tape Paper tape  1 inch 3 rolls Daily                     Note: If total out of pocket is more than $50.00 please contact the patient before processing order.     OK to forward to covered supplier.     Electronically Signed Physician: Matti Cooper JR. DPANDREI Date: 9/9/2020

## 2021-06-27 NOTE — PROGRESS NOTES
Progress Notes by Sim Avalos MD at 8/22/2019  2:00 PM     Author: Sim Avalos MD Service: -- Author Type: Physician    Filed: 8/22/2019  2:37 PM Encounter Date: 8/22/2019 Status: Signed    : Sim Avalos MD (Physician)       Infectious Disease Consultation:  Requesting MD:Kenneth  Reason for consult:  Toe ulcer    HISTORY:  Man with toe ulceration much of this summer.  ESR 45 and CRP only 1.1 in July.  Then plain film not suggesting osteo, but bone scan did.  So then to OR for bone bx with skin corinne and pt feels healing with silvadene and bandages.  Doesn't want VAC.  Denies DM.  Says next week having a cardiac procedure, sounds like an ablation to me.            Pertinent past history, past infectious disease history:  Medical History   Collapse by Default   Collapse by Default           5/28/2015 Mixed hyperlipidemia    5/28/2015 Peripheral vascular disease (H)    5/28/2015 Asthma    5/28/2015 Essential hypertension    12/8/2014 Cardiac pacemaker in situ    10/13/2014 Atrial fibrillation (H)   10/13/2014 Sinoatrial node dysfunction (H)    9/25/2014 Palpitations   9/5/2014 Aortic valve disorder   9/5/2014 Chronic kidney disease    11/9/2010 Vitamin D deficiency    6/29/2009 Restless legs syndrome    8/28/2007 Edema    8/28/2007 Essential tremor    5/6/2006 Phlebitis and thrombophlebitis of lower extremities    Date Unknown Aortic stenosis   Date Unknown Gout   Date Unknown History of blood clots    Date Unknown Hypercoagulable state (H)   Date Unknown Memory impairment    Date Unknown Shortness of breath    Date Unknown Sleep apnea    Raman as Reviewed Reviewed by LPN on 8/7/2019       Expand to view 22 items     Surgical History   Collapse by Default   Collapse by Default           7/31/2019 Incision and drainage of wound (Left)    2019 right TKA [Other]   2017 right carotid endarterectomy [Other]   2015 Cardiac pacemaker placement    Date Unknown Aortic valve replacement   Date Unknown bilateral  cataract extractions [Other]   Date Unknown Femoral artery - popliteal artery bypass graft (Left)   Date Unknown Joint replacement   Date Unknown Renal biopsy   Raman as Reviewed Reviewed by LPN on 8/7/2019       Expand to view 9 items     Family History   Collapse by Default   Collapse by Default           Mother Heart disease            Father Heart disease            Raman as Reviewed Reviewed by LPN on 8/7/2019       Expand to view 2 items     Social History   Collapse by Default   Collapse by Default           Smoking Status Former Smoker; Quit date: 7/14/1977; 1 packs/day for 35 years   Smokeless Tobacco Status Never Used   Alcohol Use No   Drug Use Not Currently   Sexually Active Not Asked   Expand to view 5 items  None            Medications:  Reviewed prior to admission meds as applicable in chart review.  Current meds are reviewed in the EMR listed MAR.     ANTIBIOTICS:    Current:none   Prior:none   Allergy to:none    SH/FH and  travel history:  Above    REVIEW OF SYSTEMS:  All other systems negative    EXAMINATION:  Vitals:    08/22/19 1409   BP: 170/80   Pulse: 68   Temp: 99  F (37.2  C)     Alert, awake  Vitals tabulated above, reviewed  HEENT:normal, gruff voice  Neck supple without lymphadenopathy  Sclera clear  CARDIOVASCULAR regular rate and rhythm, no murmur  Lungs CLEAR TO AUSCULTATION   Abdomen soft, NT/ND, absent HEPATOSPLENOMEGALY  Skin normal  Joints normal  Neurologic exam non focal  Wound:             CLINICAL DATABASE FOR---LAB/MICRO/CULTURES/IMAGING STUDIES:      on 7/31/2019 15:37   Results   Culture/Gram Stain: Tissue (Order [id number])   Contains abnormal data Culture/Gram Stain: Tissue   Order: [id number]   Status:  Final result   Visible to patient:  No (Not Released) Next appt:  09/04/2019 at 12:40 PM in Vascular Surgery (Matti Cooper JR., DPANDREI)   Specimen Information: Bone, Biopsy        Culture 1+ Coagulase negative StaphylococcusAbnormal        1+ DiphtheroidsAbnormal                Gram Stain Result  No polymorphonuclear leukocytes seen      No organisms seen            Resulting Agency: [facility]         Specimen Collected: 07/31/19 10:55 Last Resulted: 08/03/19 09:02 Order Details View Encounter Lab and Collection Details Routing Result History         C-Reactive Protein (Order [id number])   Contains abnormal data C-Reactive Protein   Order: [id number]   (suggestion)  Information displayed in this report will not trend or trigger automated decision support.    Ref Range & Units 7/8/19 1045   C-Reactive Protein 0.0 - 0.7 mg/dL 1.1High     [facility]   Specimen Collected: 07/08/19 10:45 Last Resulted: 07/08/19 15:07   Received From: [facility]  Result Received: 07/11/19 16:02      ESR (Order [id number])   Contains abnormal data ESR   Order: [id number]   (suggestion)  Information displayed in this report will not trend or trigger automated decision support.    Ref Range & Units 7/8/19 1045   Sedimentation Rate 0 - 15 mm/hr 45High     [facility]ing [facility]   Specimen Collected: 07/08/19 10:45 Last Resulted: 07/08/19 16:08   Received From: [facility]  Result Received: 07/11/19 16:02                  IMPRESSION:  L second toe possible osteo (labs not too elevated, cx skin corinne, wound pretty clean today)  Refused VAC  PICC line here, to me more hassle than clinical benefit in my opinion.    PLAN:  21 days augmentin 875mg BID, clinda 300mg three times a day  FU with Dr. Cooper for wound checks  Originally ordered quinolone, but will cancel order.  He is on amiodarone.            ELIA GONZALEZ MD  [facility]  Office [phone]

## 2021-06-27 NOTE — PROGRESS NOTES
Progress Notes by Matti Cooper DPM at 8/7/2019  2:00 PM     Author: Matti Cooper DPM Service: -- Author Type: Physician    Filed: 8/7/2019  3:45 PM Encounter Date: 8/7/2019 Status: Signed    : Matti Cooper DPM (Physician)       FOOT AND ANKLE SURGERY/PODIATRY Progress Note      ASSESSMENT:   Osteomyelitis 2nd digit left foot  Ulceration 2nd digit left foot  DM2      TREATMENT:  -Left foot ulceration is stable. No signs of infection. I discussed the benefits of using the wound vac including obtaining granulation tissue to cover exposed bone. Reviewed risks of not using the wound vac including prolonged healing, infection and need for amputation. The patient declines use of the wound vac and understands associated risks. He would like to apply his own dressing to the 2nd digit. Follow-up with ID on 8/22.     -After discussion of risk factors and consent obtained 2% Lidocaine HCL jelly was applied, under clean conditions, the left and foot ulceration(s) were debrided using currette.  Devitalized and nonviable tissue, along with any fibrin and slough, was removed to improve granulation tissue formation, stimulate wound healing, decrease overall bacteria load, disrupt biofilm formation and decrease edge senescence.  Total excisional debridement was 0.7 sq cm bone with a depth of 0.4 cm.   Ulcers were improved afterwards and .  Measures were as noted on the flow sheet. Patient tolerated this well. A gauze dressing was applied.     -He will follow-up with me in 3 weeks.      Matti Cooper DPM  [facility]      HPI: Shreyas Sanchez was seen again today for a sore on the 2nd digit left foot. Since his last visit with me the patient refused application of a wound vac. He has an appointment with ID on 8/22.     Past Medical History:   Diagnosis Date   ? Aortic stenosis    ? Aortic valve disorder 9/5/2014   ? Asthma 5/28/2015    Overview:  SIngulair controls , Advair  daily as  needed    ? Atrial fibrillation (H) 10/13/2014   ? Cardiac pacemaker in situ 12/8/2014    Overview:  Date of last device in office evaluation: 02/19/15 ? Type of device: pacemaker  ?  and model: [employer] [place]    * Date of implant: 10- ? Indication for device: tachy bladimir syndrome  ? Battery longevity documented as less than 3  Months: no ? Are any of the leads less than 3 months old: yes  ? Programming             ? Pacing mode and programmed lower rate:    ? Chronic kidney disease 9/5/2014    stage 3   ? Edema 8/28/2007    Overview:  Bumex to control , urinary functions/    ? Essential hypertension 5/28/2015    Overview:  Monopril, Bumex 2mg BID   POTASSIUM (mmol/L)  Date Value  7/31/2014 4.2   8/29/2013 4.3   10/22/2012 4.6     CREATININE (mg/dl)  Date Value  7/31/2014 1.66*  8/29/2013 1.53*  10/22/2012 1.55*     ? Essential tremor 8/28/2007    Overview:  Chronic to neck, hands 2003    ? Gout    ? History of blood clots     h/o DVT   ? Hypercoagulable state (H)    ? Memory impairment     per preop H&P   ? Mixed hyperlipidemia 5/28/2015    Overview:  Diet , fiber CV risks  Increased  LDL  Maximize  diet   recheck  over   3-4 months  cONSIDER  MEDS   LDL, CALC. (mg/dl)  Date Value  7/31/2014 125   8/29/2013 133*  10/22/2012 150*     ? Palpitations 9/25/2014   ? Peripheral vascular disease (H) 5/28/2015    Overview:  previous Fem/Pop bypass 1997    ? Phlebitis and thrombophlebitis of lower extremities 5/6/2006    Overview:  Recurrent DVT'sd lifelong Coumadin    ? Restless legs syndrome 6/29/2009    Overview:  Ongoing  problems , wakes  up  at [facility] . Hx  chronic  venous  insufficiency  and  recurrent DVT's  On Coumadin Life  long  Trial  Neurontin  300  at bedtime  to  start  the  2 at bedtime if  no better  then call  annd update  6/29/09    ? Shortness of breath     with exertion   ? Sinoatrial node dysfunction (H) 10/13/2014    Overview:  S/p [place] [employer] Dual Chamber pacemaker,  "10/27/2014    ? Sleep apnea     does not use CPAP   ? Vitamin D deficiency 11/9/2010    Overview:  VITAMIN D, 25-OH, TOT (ng/mL)  Date Value  7/31/2014 38.0   8/29/2013 24.0*  2/14/2013 33.4        Takes  2000 IU  daily Update low  push to 50, 00  weekly   x  4   weeks  / repeat  over 3  months   rechecked   8/29/23013   Increase  to  31063  weekly   and  recheck over   3  months         Past Surgical History:   Procedure Laterality Date   ? AORTIC VALVE REPLACEMENT     ? bilateral cataract extractions     ? CARDIAC PACEMAKER PLACEMENT  2015    [employer]   ? FEMORAL ARTERY - POPLITEAL ARTERY BYPASS GRAFT Left    ? INCISION AND DRAINAGE OF WOUND Left 7/31/2019    Procedure: Incision and drainage left foot,  Exostectomy 2nd digit left foot,  Excisional Debridement ulceration 2nd digit left foot;  Surgeon: Matti Cooper DPM;  Location: [facility];  Service: Podiatry   ? JOINT REPLACEMENT     ? RENAL BIOPSY     ? right carotid endarterectomy  2017   ? right TKA  2019       Allergies   Allergen Reactions   ? Celecoxib Unknown   ? Codeine      Altered mental status   ? Furosemide Myalgia   ? Latex Other (See Comments)     Tears skin off   ? Nsaids (Non-Steroidal Anti-Inflammatory Drug) Nausea And Vomiting   ? Opioids - Morphine Analogues Other (See Comments)   ? Tolmetin Other (See Comments)   ? Latex Other (See Comments)     Patient states it \"takes his skin off\"         Current Outpatient Medications:   ?  acetaminophen (TYLENOL) 500 MG tablet, Take 500 mg by mouth every 6 (six) hours as needed for pain., Disp: , Rfl:   ?  albuterol (PROAIR HFA;PROVENTIL HFA;VENTOLIN HFA) 90 mcg/actuation inhaler, Inhale 2 puffs every 6 (six) hours as needed for wheezing., Disp: , Rfl:   ?  allopurinol (ZYLOPRIM) 100 MG tablet, Take 100 mg by mouth daily.    , Disp: , Rfl:   ?  amiodarone (PACERONE) 200 MG tablet, Take 200 mg by mouth daily., Disp: , Rfl: 0  ?  apixaban (ELIQUIS) 5 mg Tab tablet, Take 5 mg by mouth 2 (two) " times a day.    , Disp: , Rfl:   ?  aspirin 81 MG EC tablet, Take 81 mg by mouth daily.    , Disp: , Rfl:   ?  bumetanide (BUMEX) 2 MG tablet, Take 2 mg by mouth daily as needed.    , Disp: , Rfl:   ?  ergocalciferol (ERGOCALCIFEROL) 50,000 unit capsule, Take 50,000 Units by mouth once a week.    , Disp: , Rfl:   ?  gabapentin (NEURONTIN) 100 MG capsule, Take 100 mg by mouth 2 (two) times a day. Take with 300 mg capsule., Disp: , Rfl:   ?  gabapentin (NEURONTIN) 300 MG capsule, Take 300 mg by mouth 2 (two) times a day.    , Disp: , Rfl:   ?  GLUC/MSM/C/BORON/MANGANESE/PRATIBHA (JOINT SUPPORT COMPLEX ORAL), Take 3 capsules by mouth once daily., Disp: , Rfl:   ?  HYDROcodone-acetaminophen 5-325 mg per tablet, Take 1 tablet by mouth every 8 (eight) hours as needed for pain., Disp: , Rfl:   ?  HYDROcodone-acetaminophen 5-325 mg per tablet, Take 1 tablet by mouth every 4 (four) hours as needed for pain., Disp: 30 tablet, Rfl: 0  ?  metoprolol succinate (TOPROL-XL) 100 MG 24 hr tablet, Take 100 mg by mouth daily.    , Disp: , Rfl:   ?  montelukast (SINGULAIR) 10 mg tablet, Take 10 mg by mouth at bedtime.    , Disp: , Rfl:   ?  nitroglycerin (NITROSTAT) 0.4 MG SL tablet, Place 0.4 mg under the tongue every 5 (five) minutes as needed for chest pain.    , Disp: , Rfl:   ?  senna-docusate (PERICOLACE) 8.6-50 mg tablet, Take 2 tablets by mouth 2 (two) times a day as needed for constipation.    , Disp: , Rfl:   ?  silver sulfADIAZINE (SILVADENE) 1 % cream, Apply topically 2 (two) times a day as needed.    , Disp: , Rfl:   ?  walker (ULTRA-LIGHT ROLLATOR) Misc, Walker with front wheels for home use., Disp: , Rfl:     Current Facility-Administered Medications:   ?  lidocaine 2 % jelly (XYLOCAINE), , Topical, PRN, Chidi Barry MD  ?  lidocaine 2 % jelly (XYLOCAINE), , Topical, Once, Matti Cooper DPM    Review of Systems - per HPI, all others negative      OBJECTIVE:  Vitals:    08/07/19 1400   BP: 138/64   Pulse: 68   Temp:  98  F (36.7  C)     General appearance: Patient is alert and fully cooperative with history & exam.  No sign of distress is noted during the visit.   Vascular: Dorsalis pedis non-palpableLeft.  Dermatologic:    VASC Wound 07/12/19 left 2nd toe (Active)   Pre Size Length 1 8/7/2019  2:00 PM   Pre Size Width 0.7 8/7/2019  2:00 PM   Pre Size Depth 0.3 8/7/2019  2:00 PM   Pre Total Sq cm 0.7 8/7/2019  2:00 PM   Undermined no 8/7/2019  2:00 PM   Tunneling no 8/7/2019  2:00 PM   Description slough 8/7/2019  2:00 PM   Prodcut Used Gauze 8/7/2019  2:00 PM       Wound 07/31/19 Non-pressure related ulcer Foot Right (Active)       Incision 07/31/19 Surgical Foot Left (Active)   Granular tissue with exposed bone 2nd digit left foot. No erythema.   Neurologic: Diminished to light touch Left.  Musculoskeletal: Contracted digits noted Left.    Imaging:     Nm Bone Scan 3 Phase    Result Date: 7/25/2019  EXAM: NM BONE SCAN 3 PHASE LOCATION: [facility] DATE/TIME: 7/25/2019 1:24 PM INDICATION: Osteomyelitis, foot, follow up osteomyelitis 2nd digit left foot COMPARISON: X-ray 07/25/2019, 07/03/2019 nuclear medicine Ceretec white blood cell study 07/18/2019. TECHNIQUE: 28 mCi of technetium 99m MDP were administered intravenously and planar images of both feet were obtained in blood flow, blood pool, and delayed phases. FINDINGS: Flow images demonstrate increased blood flow to the left forefoot. Blood pool images demonstrate increased blood pool in the soft tissues of the left foot and specifically in the region of the left second toe. Delayed images demonstrate focal increased radiotracer activity in the left second toe which corresponds to activity on white blood cell study and is consistent with osteomyelitis. There is also focal radiotracer uptake in the first MTP joint and this corresponds to an area of developing osseous destruction or erosion within the medial head of the first metatarsal as demonstrated on x-ray but  does not demonstrate radiotracer activity on white blood cell scan.     CONCLUSION: 1.  Findings consistent with osteomyelitis involving the second toe of the left foot. 2.  Area of ostial lysis or erosion within the medial head of the first metatarsal demonstrates abnormal radiotracer uptake on three-phase bone scan but is not associated with abnormal radiotracer uptake in white blood cell scan and is equivocal for osteomyelitis. An erosive process could have the same appearance. 3.  Degenerative radiotracer uptake right midfoot and forefoot.    Nm Ceretec Limited Wbc    Result Date: 7/18/2019  EXAM: NM CERETEC LIMITED WBC LOCATION: [facility] DATE/TIME: 7/18/2019 2:28 PM INDICATION: osteomyelitis 2nd digit left foot COMPARISON: Left foot radiographs from 7/3/2019 are reviewed. TECHNIQUE: 25.7 mCi technetium 99m Ceretec were used to label the patient's white blood cells, which were then administered intravenously. Two-hour delayed planar imaging of the feet was obtained. FINDINGS: Marked focal white blood cell accumulation in the region of the left second toe indicates infection, which could be cellulitis or osteomyelitis. Recommend proceeding to three-phase bone scan to distinguish the two. Right foot negative.     Xr Foot Left 3 Or More Vws Standing    Result Date: 7/26/2019  Bony erosion medial 1st metatarsal head. No discrete bony destruction along the 2nd digit. Mild bunion deformity.     Us Arterial Pressures Legs Bilateral    Result Date: 7/15/2019  [facility] OUTPATIENT EXAM: RESTING ANKLE-BRACHIAL INDICES (ABIs) WITH SEGMENTAL ARTERIAL PRESSURES OF THE LOWER EXTREMITIES BILATERALLY INDICATION: Peripheral arterial disease. Nondiabetic. Previous smoker. Hypertension. COMPARISON: None. EDGAR FINDINGS: SEGMENTAL BP RIGHT (mmHg) Brachial: 180 High Thigh: >254; Index NC Low Thigh: 194; Index 1.08 Calf: 200; Index 1.11 Ankle (PT): >254; Index NC Ankle (DP): 145; Index 0.81 Digit: 141; Index 0.78  LEFT (mmHg) Brachial: 175 High Thigh: >254; Index NC Low Thigh: 195; Index 1.08 Calf: 138; Index 0.77 Ankle (PT): 118; Index 0.66 Ankle (DP): 149; Index 0.83 Digit: 68; Index 0.38     1. RIGHT LOWER EXTREMITY: EDGAR at rest is mildly diminished at 0.81. The digital pressure is 141 mm Hg which suggests adequate wound healing potential. The segmental arterial pressures are normal in the low thigh and calf without evidence of a hemodynamically significant lesion. Some segments are noncompressible and there is likely arterial calcification. The segmental waveforms are normal and overall there is no evidence of a high-grade stenosis or occlusion. 2. LEFT LOWER EXTREMITY: EDGAR at rest is mildly diminished at 0.83. The digital pressure is 66 mm Hg which suggests adequate wound healing potential. The segmental arterial pressures reveal a gradient between the low thigh and the calf raising the possibility of a stenosis in the popliteal segment. No additional segmental gradient. Some segments are noncompressible and there is likely arterial calcification. The segmental waveforms are normal.         Picture:

## 2021-06-27 NOTE — PROGRESS NOTES
Progress Notes by Matti Cooper DPM at 7/12/2019  9:40 AM     Author: Matti Cooper DPM Service: -- Author Type: Physician    Filed: 7/12/2019 11:38 AM Encounter Date: 7/12/2019 Status: Signed    : Matti Cooper DPM (Physician)       FOOT AND ANKLE SURGERY/PODIATRY CONSULT NOTE        ASSESSMENT:   Ulceration 2nd digit left foot  Cellulitis left foot   DM2      TREATMENT:  -Ulceration dorsal lateral 2nd digit left foot has non-viable tissue with exposed bone. Localized erythema. I would like to evaluate for osteomyelitis and have referred the patient for ceretec and 3 phase bone scan. He will finish current course of doxycycline.    -After discussion of risk factors and consent obtained 2% Lidocaine HCL jelly was applied, under clean conditions, the left and foot ulceration(s) were debrided using currette.  Devitalized and nonviable tissue, along with any fibrin and slough, was removed to improve granulation tissue formation, stimulate wound healing, decrease overall bacteria load, disrupt biofilm formation and decrease edge senescence.  Total excisional debridement was 1.2 sq cm bone with a depth of 0.4 cm.   Ulcers were improved afterwards and .  Measures were as noted on the flow sheet. A gauze dressing was applied.   -He will continue to apply a gauze dressing qday.    -I have asked him to follow-up in 1-2 weeks to review imaging reports and we will be guided by the results. He will closely monitor for any erythema and contact my office with concerns.       Matti Cooper DPM  [facility]      HPI: Shreyas Sanchez was seen today for a sore on the 2nd digit left foot. According to the patient he developed the sore in March of this year. He has been treated by a podiatrist in [place], and is currently on doxycycline for an infection. He denies N/V/F/C.     Past Medical History:   Diagnosis Date   ? Aortic valve disorder 9/5/2014   ? Asthma 5/28/2015    Overview:   SIngulair controls , Advair  daily as needed    ? Atrial fibrillation (H) 10/13/2014   ? Cardiac pacemaker in situ 12/8/2014    Overview:  Date of last device in office evaluation: 02/19/15 ? Type of device: pacemaker  ?  and model: [employer] [place]    * Date of implant: 10- ? Indication for device: tachy bladimir syndrome  ? Battery longevity documented as less than 3  Months: no ? Are any of the leads less than 3 months old: yes  ? Programming             ? Pacing mode and programmed lower rate:    ? Chronic kidney disease 9/5/2014   ? Edema 8/28/2007    Overview:  Bumex to control , urinary functions/    ? Essential hypertension 5/28/2015    Overview:  Monopril, Bumex 2mg BID   POTASSIUM (mmol/L)  Date Value  7/31/2014 4.2   8/29/2013 4.3   10/22/2012 4.6     CREATININE (mg/dl)  Date Value  7/31/2014 1.66*  8/29/2013 1.53*  10/22/2012 1.55*     ? Essential tremor 8/28/2007    Overview:  Chronic to neck, hands 2003    ? Mixed hyperlipidemia 5/28/2015    Overview:  Diet , fiber CV risks  Increased  LDL  Maximize  diet   recheck  over   3-4 months  cONSIDER  MEDS   LDL, CALC. (mg/dl)  Date Value  7/31/2014 125   8/29/2013 133*  10/22/2012 150*     ? Palpitations 9/25/2014   ? Peripheral vascular disease (H) 5/28/2015    Overview:  previous Fem/Pop bypass 1997    ? Phlebitis and thrombophlebitis of lower extremities 5/6/2006    Overview:  Recurrent DVT'sd lifelong Coumadin    ? Restless legs syndrome 6/29/2009    Overview:  Ongoing  problems , wakes  up  at [facility] . Hx  chronic  venous  insufficiency  and  recurrent DVT's  On Coumadin Life  long  Trial  Neurontin  300  at bedtime  to  start  the  2 at bedtime if  no better  then call  annd update  6/29/09    ? Sinoatrial node dysfunction (H) 10/13/2014    Overview:  S/p [place] [employer] Dual Chamber pacemaker, 10/27/2014    ? Vitamin D deficiency 11/9/2010    Overview:  VITAMIN D, 25-OH, TOT (ng/mL)  Date Value  7/31/2014 38.0   8/29/2013  "24.0*  2/14/2013 33.4        Takes  2000 IU  daily Update low  push to 50, 00  weekly   x  4   weeks  / repeat  over 3  months   rechecked   8/29/23013   Increase  to  28661  weekly   and  recheck over   3  months         No past surgical history on file.    Allergies   Allergen Reactions   ? Celecoxib Unknown   ? Codeine    ? Furosemide Myalgia   ? Latex Other (See Comments)     Tears skin off   ? Opioids - Morphine Analogues Other (See Comments)   ? Tolmetin Other (See Comments)   ? Latex Other (See Comments)     Patient states it \"takes his skin off\"         Current Outpatient Medications:   ?  acetaminophen (TYLENOL) 500 MG tablet, Take 500 mg by mouth every 6 (six) hours as needed for pain., Disp: , Rfl:   ?  allopurinol (ZYLOPRIM) 100 MG tablet, , Disp: , Rfl:   ?  amiodarone (PACERONE) 200 MG tablet, Take 200 mg by mouth daily., Disp: , Rfl: 0  ?  apixaban (ELIQUIS) 5 mg Tab tablet, Take 5 mg by mouth., Disp: , Rfl:   ?  aspirin 81 MG EC tablet, Take 81 mg by mouth., Disp: , Rfl:   ?  bumetanide (BUMEX) 2 MG tablet, , Disp: , Rfl:   ?  CARTIA  mg 24 hr capsule, , Disp: , Rfl:   ?  clotrimazole-betamethasone (LOTRISONE) cream, , Disp: , Rfl:   ?  doxycycline (VIBRA-TABS) 100 MG tablet, Take 100 mg by mouth., Disp: , Rfl:   ?  ergocalciferol (ERGOCALCIFEROL) 50,000 unit capsule, Take 50,000 Units by mouth., Disp: , Rfl:   ?  gabapentin (NEURONTIN) 100 MG capsule, Take 100 mg by mouth., Disp: , Rfl:   ?  gabapentin (NEURONTIN) 300 MG capsule, , Disp: , Rfl:   ?  GLUC/MSM/C/BORON/MANGANESE/PRATIBHA (JOINT SUPPORT COMPLEX ORAL), Take 3 capsules by mouth once daily., Disp: , Rfl:   ?  HYDROcodone-acetaminophen 5-325 mg per tablet, Take 1-2 tablets by mouth every 4 (four) hours as needed for pain., Disp: 30 tablet, Rfl: 0  ?  lisinopril (PRINIVIL,ZESTRIL) 10 MG tablet, Take 10 mg by mouth daily., Disp: , Rfl: 3  ?  lisinopril (PRINIVIL,ZESTRIL) 5 MG tablet, , Disp: , Rfl:   ?  methylPREDNISolone (MEDROL DOSEPACK) 4 " mg tablet, , Disp: , Rfl:   ?  metoprolol succinate (TOPROL-XL) 100 MG 24 hr tablet, Take 100 mg by mouth., Disp: , Rfl:   ?  montelukast (SINGULAIR) 10 mg tablet, , Disp: , Rfl:   ?  nitroglycerin (NITROSTAT) 0.4 MG SL tablet, Place 0.4 mg under the tongue., Disp: , Rfl:   ?  senna-docusate (PERICOLACE) 8.6-50 mg tablet, Take 2 tablets by mouth., Disp: , Rfl:   ?  silver sulfADIAZINE (SILVADENE) 1 % cream, Apply topically., Disp: , Rfl:   ?  walker (ULTRA-LIGHT ROLLATOR) Misc, Walker with front wheels for home use., Disp: , Rfl:   ?  warfarin (COUMADIN) 5 MG tablet, , Disp: , Rfl:     Current Facility-Administered Medications:   ?  lidocaine 2 % jelly (XYLOCAINE), , Topical, PRN, Chidi Barry MD, 1 application at 08/29/17 0853    No past surgical history on file.    Social History     Social History Narrative   ? Not on file       Family History   Problem Relation Age of Onset   ? Heart disease Mother    ? Heart disease Father        Review of Systems - per HPI, all others negative      OBJECTIVE:  Appearance: alert, well appearing, and in no distress.    Vitals:    07/12/19 0938   BP: 138/62   Pulse: 62   Temp: 98.6  F (37  C)   SpO2: 97%       BMI= Body mass index is 34.01 kg/m .    General appearance: Patient is alert and fully cooperative with history & exam.  No sign of distress is noted during the visit.     Psychiatric: Affect is pleasant & appropriate.  Patient appears motivated to improve health.     Respiratory: Breathing is regular & unlabored while sitting.     HEENT: Hearing is intact to spoken word.  Speech is clear.  No gross evidence of visual impairment that would impact ambulation.        Vascular: Dorsalis pedis palpableLeft.  Dermatologic:   Wound 08/29/17 right lateral ankle (Active)   Pre Size Length 0 10/10/2017  9:00 AM   Pre Size Width 0 10/10/2017  9:00 AM   Pre Size Depth 0 10/10/2017  9:00 AM   Pre Total Sq cm 0 10/10/2017  9:00 AM   Prodcut Used Foam 10/10/2017  9:00 AM       [facility]  Wound 07/12/19 left 2nd toe (Active)   Pre Size Length 1.2 7/12/2019  9:00 AM   Pre Size Width 1 7/12/2019  9:00 AM   Pre Size Depth 0.3 7/12/2019  9:00 AM   Pre Total Sq cm 1.2 7/12/2019  9:00 AM   Ulceration along the dorsal lateral 2nd digit left foot probes to bone with non-viable tissue. Localized erythema.   Neurologic: Diminished to light touch Left.  Musculoskeletal: Contracted digits noted Left.    Imaging:     No results found.         Picture:

## 2021-06-28 NOTE — PROGRESS NOTES
Progress Notes by Matti Cooper DPM at 10/23/2019 12:00 PM     Author: Matti Cooper DPM Service: -- Author Type: Physician    Filed: 10/23/2019 12:56 PM Encounter Date: 10/23/2019 Status: Signed    : Matti Cooper DPM (Physician)       FOOT AND ANKLE SURGERY/PODIATRY Progress Note      ASSESSMENT:   Osteomyelitis 2nd digit left foot  Ulceration 2nd digit left foot  DM2      TREATMENT:  --Ulceration along the dorsal 2nd digit left foot is measuring similar to previous visit. No exposed bone noted on exam today. Finished course of antibiotics per ID. I recommend limited walking in a surgical shoe.     -After discussion of risk factors and consent obtained 2% Lidocaine HCL jelly was applied, under clean conditions, the left and foot ulceration(s) were debrided using currette and #15 blade scalpel.  Devitalized and nonviable tissue, along with any fibrin and slough, was removed to improve granulation tissue formation, stimulate wound healing, decrease overall bacteria load, disrupt biofilm formation and decrease edge senescence.  Total excisional debridement was 0.5 sq cm into the subcutaneous tissue with a depth of 0.2 cm.   Ulcers were improved afterwards and .  Measures were as noted on the flow sheet. Patient tolerated this well. Endoform with a gauze dresssing was applied. He will continue to apply Endoform with a gauze dresssing as directed.    -He will follow-up in 3 weeks.      Matti Cooper DPM  [facility]      HPI: Shreyas Sanchez was seen again today for a sore on the 2nd digit left foot. He is doing well today. Continues to use silvadene with and without a dressing on the sore. He finished antibiotics with ID 2 weeks ago.     Past Medical History:   Diagnosis Date   ? Aortic stenosis    ? Aortic valve disorder 9/5/2014   ? Asthma 5/28/2015    Overview:  SIngulair controls , Advair  daily as needed    ? Atrial fibrillation (H) 10/13/2014   ? Cardiac pacemaker in  situ 12/8/2014    Overview:  Date of last device in office evaluation: 02/19/15 ? Type of device: pacemaker  ?  and model: [employer] [place]    * Date of implant: 10- ? Indication for device: tachy bladimir syndrome  ? Battery longevity documented as less than 3  Months: no ? Are any of the leads less than 3 months old: yes  ? Programming             ? Pacing mode and programmed lower rate:    ? Chronic kidney disease 9/5/2014    stage 3   ? Edema 8/28/2007    Overview:  Bumex to control , urinary functions/    ? Essential hypertension 5/28/2015    Overview:  Monopril, Bumex 2mg BID   POTASSIUM (mmol/L)  Date Value  7/31/2014 4.2   8/29/2013 4.3   10/22/2012 4.6     CREATININE (mg/dl)  Date Value  7/31/2014 1.66*  8/29/2013 1.53*  10/22/2012 1.55*     ? Essential tremor 8/28/2007    Overview:  Chronic to neck, hands 2003    ? Gout    ? History of blood clots     h/o DVT   ? Hypercoagulable state (H)    ? Memory impairment     per preop H&P   ? Mixed hyperlipidemia 5/28/2015    Overview:  Diet , fiber CV risks  Increased  LDL  Maximize  diet   recheck  over   3-4 months  cONSIDER  MEDS   LDL, CALC. (mg/dl)  Date Value  7/31/2014 125   8/29/2013 133*  10/22/2012 150*     ? Palpitations 9/25/2014   ? Peripheral vascular disease (H) 5/28/2015    Overview:  previous Fem/Pop bypass 1997    ? Phlebitis and thrombophlebitis of lower extremities 5/6/2006    Overview:  Recurrent DVT'sd lifelong Coumadin    ? Restless legs syndrome 6/29/2009    Overview:  Ongoing  problems , wakes  up  at [facility] . Hx  chronic  venous  insufficiency  and  recurrent DVT's  On Coumadin Life  long  Trial  Neurontin  300  at bedtime  to  start  the  2 at bedtime if  no better  then call  annd update  6/29/09    ? Shortness of breath     with exertion   ? Sinoatrial node dysfunction (H) 10/13/2014    Overview:  S/p [employer] Dual Chamber pacemaker, 10/27/2014    ? Sleep apnea     does not use CPAP   ? Vitamin D  "deficiency 11/9/2010    Overview:  VITAMIN D, 25-OH, TOT (ng/mL)  Date Value  7/31/2014 38.0   8/29/2013 24.0*  2/14/2013 33.4        Takes  2000 IU  daily Update low  push to 50, 00  weekly   x  4   weeks  / repeat  over 3  months   rechecked   8/29/23013   Increase  to  21200  weekly   and  recheck over   3  months         Past Surgical History:   Procedure Laterality Date   ? AORTIC VALVE REPLACEMENT     ? bilateral cataract extractions     ? CARDIAC PACEMAKER PLACEMENT  2015    [employer]tronic   ? FEMORAL ARTERY - POPLITEAL ARTERY BYPASS GRAFT Left    ? INCISION AND DRAINAGE OF WOUND Left 7/31/2019    Procedure: Incision and drainage left foot,  Exostectomy 2nd digit left foot,  Excisional Debridement ulceration 2nd digit left foot;  Surgeon: Matti Cooper DPM;  Location: [facility];  Service: Podiatry   ? JOINT REPLACEMENT     ? RENAL BIOPSY     ? right carotid endarterectomy  2017   ? right TKA  2019       Allergies   Allergen Reactions   ? Celecoxib Unknown   ? Codeine      Altered mental status   ? Furosemide Myalgia   ? Latex Other (See Comments)     Tears skin off   ? Nsaids (Non-Steroidal Anti-Inflammatory Drug) Nausea And Vomiting   ? Opioids - Morphine Analogues Other (See Comments)   ? Tolmetin Other (See Comments)   ? Latex Other (See Comments)     Patient states it \"takes his skin off\"         Current Outpatient Medications:   ?  acetaminophen (TYLENOL) 500 MG tablet, Take 500 mg by mouth every 6 (six) hours as needed for pain., Disp: , Rfl:   ?  albuterol (PROAIR HFA;PROVENTIL HFA;VENTOLIN HFA) 90 mcg/actuation inhaler, Inhale 2 puffs every 6 (six) hours as needed for wheezing., Disp: , Rfl:   ?  allopurinol (ZYLOPRIM) 100 MG tablet, Take 100 mg by mouth daily.    , Disp: , Rfl:   ?  apixaban (ELIQUIS) 5 mg Tab tablet, Take 5 mg by mouth 2 (two) times a day.    , Disp: , Rfl:   ?  aspirin 81 MG EC tablet, Take 81 mg by mouth daily.    , Disp: , Rfl:   ?  bumetanide (BUMEX) 2 MG tablet, Take 2 " mg by mouth daily as needed.    , Disp: , Rfl:   ?  clotrimazole-betamethasone (LOTRISONE) cream, WILLIE EXT AA BID PRN, Disp: , Rfl: 1  ?  ergocalciferol (ERGOCALCIFEROL) 50,000 unit capsule, Take 50,000 Units by mouth once a week.    , Disp: , Rfl:   ?  fluticasone propion-salmeterol (ADVAIR) 250-50 mcg/dose DISKUS, 1 puff by Other route., Disp: , Rfl:   ?  gabapentin (NEURONTIN) 100 MG capsule, Take 100 mg by mouth 2 (two) times a day. Take with 300 mg capsule., Disp: , Rfl:   ?  gabapentin (NEURONTIN) 300 MG capsule, Take 300 mg by mouth 2 (two) times a day.    , Disp: , Rfl:   ?  GLUC/MSM/C/BORON/MANGANESE/PRATIBHA (JOINT SUPPORT COMPLEX ORAL), Take 3 capsules by mouth once daily., Disp: , Rfl:   ?  HYDROcodone-acetaminophen 5-325 mg per tablet, Take 1 tablet by mouth every 8 (eight) hours as needed for pain., Disp: , Rfl:   ?  HYDROcodone-acetaminophen 5-325 mg per tablet, Take 1 tablet by mouth every 4 (four) hours as needed for pain., Disp: 30 tablet, Rfl: 0  ?  levoFLOXacin (LEVAQUIN) 500 MG tablet, TK 1 T PO D FOR 21 DAYS, Disp: , Rfl:   ?  lisinopril (PRINIVIL,ZESTRIL) 10 MG tablet, Take 10 mg by mouth., Disp: , Rfl:   ?  metoprolol succinate (TOPROL-XL) 100 MG 24 hr tablet, Take 50 mg by mouth daily.    , Disp: , Rfl:   ?  montelukast (SINGULAIR) 10 mg tablet, Take 10 mg by mouth at bedtime.    , Disp: , Rfl:   ?  nitroglycerin (NITROSTAT) 0.4 MG SL tablet, Place 0.4 mg under the tongue every 5 (five) minutes as needed for chest pain.    , Disp: , Rfl:   ?  senna-docusate (PERICOLACE) 8.6-50 mg tablet, Take 2 tablets by mouth 2 (two) times a day as needed for constipation.    , Disp: , Rfl:   ?  silver sulfADIAZINE (SILVADENE) 1 % cream, Apply topically 2 (two) times a day as needed., Disp: 50 g, Rfl: 0  ?  walker (ULTRA-LIGHT ROLLATOR) Misc, Walker with front wheels for home use., Disp: , Rfl:     Current Facility-Administered Medications:   ?  lidocaine 2 % jelly (XYLOCAINE), , Topical, PRN, Chidi Barry,  MD  ?  lidocaine 2 % jelly (XYLOCAINE), , Topical, Once, Matti Cooper DPM    Review of Systems - per HPI, all others negative      OBJECTIVE:  Vitals:    10/23/19 1220   BP: 138/84   Pulse: 80   Resp: 16   Temp: 98.1  F (36.7  C)     General appearance: Patient is alert and fully cooperative with history & exam.  No sign of distress is noted during the visit.   Vascular: Dorsalis pedis palpableLeft.  Dermatologic:    VASC Wound 07/12/19 left 2nd toe (Active)   Pre Size Length 1 10/23/2019 12:00 PM   Pre Size Width 0.5 10/23/2019 12:00 PM   Pre Size Depth 0.1 10/23/2019 12:00 PM   Pre Total Sq cm 0.5 10/23/2019 12:00 PM   Undermined no 8/7/2019  2:00 PM   Tunneling no 8/7/2019  2:00 PM   Description slough 8/7/2019  2:00 PM   Prodcut Used Gauze 8/7/2019  2:00 PM       Wound 07/31/19 Non-pressure related ulcer Foot Right (Active)       Incision 07/31/19 Surgical Foot Left (Active)   Granular base. No exposed bone noted. No erythema.   Neurologic: Diminished to light touch Left.  Musculoskeletal: Contracted digits noted Left.    Imaging:     No results found.       Picture:

## 2021-06-28 NOTE — PROGRESS NOTES
Progress Notes by Matti Cooper DPM at 9/25/2019 12:00 PM     Author: Matti Cooper DPM Service: -- Author Type: Physician    Filed: 9/25/2019 12:54 PM Encounter Date: 9/25/2019 Status: Signed    : Matti Cooper DPM (Physician)       FOOT AND ANKLE SURGERY/PODIATRY Progress Note      ASSESSMENT:   Osteomyelitis 2nd digit left foot  Ulceration 2nd digit left foot  DM2      TREATMENT:  -Ulceration along the dorsal 2nd digit left foot is measuring similar to previous visit with exposed bone. I discussed this with the patient and recommend limited walking in a surgical shoe with use of a wound vac, patient declines. Reviewed risks including non-healing wound and need for amputation. He would prefer to continue with silvadene. Antibiotics per ID.    -After discussion of risk factors and consent obtained 2% Lidocaine HCL jelly was applied, under clean conditions, the left and foot ulceration(s) were debrided using currette.  Devitalized and nonviable tissue, along with any fibrin and slough, was removed to improve granulation tissue formation, stimulate wound healing, decrease overall bacteria load, disrupt biofilm formation and decrease edge senescence.  Total excisional debridement was 0.7 sq cm bone with a depth of 0.4 cm.   Ulcers were improved afterwards and .  Measures were as noted on the flow sheet. Patient tolerated this well. A gauze dressing was applied.     -He will follow-up with me in 3 weeks.      Matti Cooper DPM  [facility]      HPI: Shreyas Sanchez was seen again today for a sore on the 2nd digit left foot. Since his last visit, he has been using silvadene per his directive, walking in regular shoes.       Past Medical History:   Diagnosis Date   ? Aortic stenosis    ? Aortic valve disorder 9/5/2014   ? Asthma 5/28/2015    Overview:  SIngulair controls , Advair  daily as needed    ? Atrial fibrillation (H) 10/13/2014   ? Cardiac pacemaker in situ 12/8/2014     Overview:  Date of last device in office evaluation: 02/19/15 ? Type of device: pacemaker  ?  and model: [employer] [place]    * Date of implant: 10- ? Indication for device: tachy bladimir syndrome  ? Battery longevity documented as less than 3  Months: no ? Are any of the leads less than 3 months old: yes  ? Programming             ? Pacing mode and programmed lower rate:    ? Chronic kidney disease 9/5/2014    stage 3   ? Edema 8/28/2007    Overview:  Bumex to control , urinary functions/    ? Essential hypertension 5/28/2015    Overview:  Monopril, Bumex 2mg BID   POTASSIUM (mmol/L)  Date Value  7/31/2014 4.2   8/29/2013 4.3   10/22/2012 4.6     CREATININE (mg/dl)  Date Value  7/31/2014 1.66*  8/29/2013 1.53*  10/22/2012 1.55*     ? Essential tremor 8/28/2007    Overview:  Chronic to neck, hands 2003    ? Gout    ? History of blood clots     h/o DVT   ? Hypercoagulable state (H)    ? Memory impairment     per preop H&P   ? Mixed hyperlipidemia 5/28/2015    Overview:  Diet , fiber CV risks  Increased  LDL  Maximize  diet   recheck  over   3-4 months  cONSIDER  MEDS   LDL, CALC. (mg/dl)  Date Value  7/31/2014 125   8/29/2013 133*  10/22/2012 150*     ? Palpitations 9/25/2014   ? Peripheral vascular disease (H) 5/28/2015    Overview:  previous Fem/Pop bypass 1997    ? Phlebitis and thrombophlebitis of lower extremities 5/6/2006    Overview:  Recurrent DVT'sd lifelong Coumadin    ? Restless legs syndrome 6/29/2009    Overview:  Ongoing  problems , wakes  up  at [facility] . Hx  chronic  venous  insufficiency  and  recurrent DVT's  On Coumadin Life  long  Trial  Neurontin  300  at bedtime  to  start  the  2 at bedtime if  no better  then call  annd update  6/29/09    ? Shortness of breath     with exertion   ? Sinoatrial node dysfunction (H) 10/13/2014    Overview:  S/p [place] [employer] Dual Chamber pacemaker, 10/27/2014    ? Sleep apnea     does not use CPAP   ? Vitamin D deficiency 11/9/2010     "Overview:  VITAMIN D, 25-OH, TOT (ng/mL)  Date Value  7/31/2014 38.0   8/29/2013 24.0*  2/14/2013 33.4        Takes  2000 IU  daily Update low  push to 50, 00  weekly   x  4   weeks  / repeat  over 3  months   rechecked   8/29/23013   Increase  to  06549  weekly   and  recheck over   3  months         Past Surgical History:   Procedure Laterality Date   ? AORTIC VALVE REPLACEMENT     ? bilateral cataract extractions     ? CARDIAC PACEMAKER PLACEMENT  2015    [employer]   ? FEMORAL ARTERY - POPLITEAL ARTERY BYPASS GRAFT Left    ? INCISION AND DRAINAGE OF WOUND Left 7/31/2019    Procedure: Incision and drainage left foot,  Exostectomy 2nd digit left foot,  Excisional Debridement ulceration 2nd digit left foot;  Surgeon: Matti Cooper DPM;  [place] [facility] [place]  Service: Podiatry   ? JOINT REPLACEMENT     ? RENAL BIOPSY     ? right carotid endarterectomy  2017   ? right TKA  2019       Allergies   Allergen Reactions   ? Celecoxib Unknown   ? Codeine      Altered mental status   ? Furosemide Myalgia   ? Latex Other (See Comments)     Tears skin off   ? Nsaids (Non-Steroidal Anti-Inflammatory Drug) Nausea And Vomiting   ? Opioids - Morphine Analogues Other (See Comments)   ? Tolmetin Other (See Comments)   ? Latex Other (See Comments)     Patient states it \"takes his skin off\"         Current Outpatient Medications:   ?  acetaminophen (TYLENOL) 500 MG tablet, Take 500 mg by mouth every 6 (six) hours as needed for pain., Disp: , Rfl:   ?  albuterol (PROAIR HFA;PROVENTIL HFA;VENTOLIN HFA) 90 mcg/actuation inhaler, Inhale 2 puffs every 6 (six) hours as needed for wheezing., Disp: , Rfl:   ?  allopurinol (ZYLOPRIM) 100 MG tablet, Take 100 mg by mouth daily.    , Disp: , Rfl:   ?  apixaban (ELIQUIS) 5 mg Tab tablet, Take 5 mg by mouth 2 (two) times a day.    , Disp: , Rfl:   ?  aspirin 81 MG EC tablet, Take 81 mg by mouth daily.    , Disp: , Rfl:   ?  bumetanide (BUMEX) 2 MG tablet, Take 2 mg by mouth daily as " needed.    , Disp: , Rfl:   ?  ergocalciferol (ERGOCALCIFEROL) 50,000 unit capsule, Take 50,000 Units by mouth once a week.    , Disp: , Rfl:   ?  gabapentin (NEURONTIN) 100 MG capsule, Take 100 mg by mouth 2 (two) times a day. Take with 300 mg capsule., Disp: , Rfl:   ?  gabapentin (NEURONTIN) 300 MG capsule, Take 300 mg by mouth 2 (two) times a day.    , Disp: , Rfl:   ?  GLUC/MSM/C/BORON/MANGANESE/PRATIBHA (JOINT SUPPORT COMPLEX ORAL), Take 3 capsules by mouth once daily., Disp: , Rfl:   ?  HYDROcodone-acetaminophen 5-325 mg per tablet, Take 1 tablet by mouth every 8 (eight) hours as needed for pain., Disp: , Rfl:   ?  HYDROcodone-acetaminophen 5-325 mg per tablet, Take 1 tablet by mouth every 4 (four) hours as needed for pain., Disp: 30 tablet, Rfl: 0  ?  metoprolol succinate (TOPROL-XL) 100 MG 24 hr tablet, Take 50 mg by mouth daily.    , Disp: , Rfl:   ?  montelukast (SINGULAIR) 10 mg tablet, Take 10 mg by mouth at bedtime.    , Disp: , Rfl:   ?  nitroglycerin (NITROSTAT) 0.4 MG SL tablet, Place 0.4 mg under the tongue every 5 (five) minutes as needed for chest pain.    , Disp: , Rfl:   ?  senna-docusate (PERICOLACE) 8.6-50 mg tablet, Take 2 tablets by mouth 2 (two) times a day as needed for constipation.    , Disp: , Rfl:   ?  silver sulfADIAZINE (SILVADENE) 1 % cream, Apply topically 2 (two) times a day as needed., Disp: 50 g, Rfl: 0  ?  walker (ULTRA-LIGHT ROLLATOR) Misc, Walker with front wheels for home use., Disp: , Rfl:     Current Facility-Administered Medications:   ?  lidocaine 2 % jelly (XYLOCAINE), , Topical, PRN, Chidi Barry MD  ?  lidocaine 2 % jelly (XYLOCAINE), , Topical, Once, Matti Cooper DPM    Review of Systems - per HPI, all others negative      OBJECTIVE:  Vitals:    09/25/19 1159   BP: 124/76   Pulse: 84   Resp: 16   Temp: 98.3  F (36.8  C)     General appearance: Patient is alert and fully cooperative with history & exam.  No sign of distress is noted during the visit.    Vascular: Dorsalis pedis palpableLeft.  Dermatologic:    VASC Wound 07/12/19 left 2nd toe (Active)   Pre Size Length 1 9/25/2019 12:00 PM   Pre Size Width 0.7 9/25/2019 12:00 PM   Pre Size Depth 0.2 9/25/2019 12:00 PM   Pre Total Sq cm 0.7 9/25/2019 12:00 PM   Undermined no 8/7/2019  2:00 PM   Tunneling no 8/7/2019  2:00 PM   Description slough 8/7/2019  2:00 PM   Prodcut Used Gauze 8/7/2019  2:00 PM       Wound 07/31/19 Non-pressure related ulcer Foot Right (Active)       Incision 07/31/19 Surgical Foot Left (Active)   Majority of the wound has a granular base, small area of exposed bone at the PIPJ, head of the proximal phalanx. No erythema.   Neurologic: Diminished to light touch Left.  Musculoskeletal: Contracted digits noted Left.    Imaging:     No results found.       Picture:

## 2021-06-29 NOTE — PROGRESS NOTES
"Progress Notes by Mouna Gerard NP at 9/30/2020  2:40 PM     Author: Mouna Gerard NP Service: -- Author Type: Nurse Practitioner    Filed: 10/5/2020 12:29 PM Encounter Date: 9/30/2020 Status: Addendum    : Mouna Gerard NP (Nurse Practitioner)    Related Notes: Original Note by Mouna Gerard NP (Nurse Practitioner) filed at 9/30/2020  4:40 PM               [facility] Consult Note    Date of Service:  9/30/2020    Requesting Provider: Dr. Matti Cooper    Chief Complaint: BLE swelling and ulcerations    History of Present Illness: Shreyas Sanchez is being seen at [facility] today regarding BLE swelling and ulcerations. They arrive to the clinic today alone. The patient reports that he underwent vein surgery 2 years ago. He devleoped ulcers on the legs this past summer; reports these are traumatic \"I bumped myself when getting on the tractor\" He was being seen by Dr. Cooper for foot ulcer this was dressed with endoform and has since healed. . Was currently/previously using santyl however this burned so he stopped and went to [facility]. Reports pain of 4/10 in the wounds; currently using gabapentin for pain. Has used compression stockings as compression in the past, is currently using nothing for compression stopped wearing due to the wounds. Denies any fevers, chills, or generalized ill feeling. Denies history of cancer. Sleeps in a bed/recliner with legs elevated. Denies history of cellulitis. Positive history of DVT, joint replacement and vein procedures.I personally reviewed outside imaging, lab work, and progress noted through Care Everywhere and outside records. He is inconsistent with taking his bumex; tries to take every other day but activities get in the way. Lives with his wife. Former smoker; smoked for 25 years 1 ppd. Previously worked as a .        Review of Systems:   Constitutional:  negative  for fever, chills or night sweats  EENTM: negative for " glasses;  negative [place]  GI:  negative for nausea/vomiting;  negative for constipation  negative diarrhea;  negative for fecal incontinence negative weight loss  :  negative dysuria, negative incontinence  MS: patient is ambulatory;  does use assistive devices  Cardiac:  positive aortic valve replacement; afib on Eliquis  Respiratory:  negative SOB  Endocrine:  negative diabetes  Psych:  negative depression/anxiety    Past Medical History:    Past Medical History:   Diagnosis Date   ? Aortic stenosis    ? Aortic valve disorder 9/5/2014   ? Asthma 5/28/2015    Overview:  SIngulair controls , Advair  daily as needed    ? Atrial fibrillation (H) 10/13/2014   ? Cardiac pacemaker in situ 12/8/2014    Overview:  Date of last device in office evaluation: 02/19/15 ? Type of device: pacemaker  ?  and model: [employer] [place]    * Date of implant: 10- ? Indication for device: tachy bladimir syndrome  ? Battery longevity documented as less than 3  Months: no ? Are any of the leads less than 3 months old: yes  ? Programming             ? Pacing mode and programmed lower rate:    ? Chronic kidney disease 9/5/2014    stage 3   ? Deep vein thrombosis (H)    ? Edema 8/28/2007    Overview:  Bumex to control , urinary functions/    ? Essential hypertension 5/28/2015    Overview:  Monopril, Bumex 2mg BID   POTASSIUM (mmol/L)  Date Value  7/31/2014 4.2   8/29/2013 4.3   10/22/2012 4.6     CREATININE (mg/dl)  Date Value  7/31/2014 1.66*  8/29/2013 1.53*  10/22/2012 1.55*     ? Essential tremor 8/28/2007    Overview:  Chronic to neck, hands 2003    ? Gout    ? History of blood clots     h/o DVT   ? Hypercoagulable state (H)    ? Memory impairment     per preop H&P   ? Mixed hyperlipidemia 5/28/2015    Overview:  Diet , fiber CV risks  Increased  LDL  Maximize  diet   recheck  over   3-4 months  cONSIDER  MEDS   LDL, CALC. (mg/dl)  Date Value  7/31/2014 125   8/29/2013 133*  10/22/2012 150*     ? Palpitations  9/25/2014   ? Peripheral vascular disease (H) 5/28/2015    Overview:  previous Fem/Pop bypass 1997    ? Phlebitis and thrombophlebitis of lower extremities 5/6/2006    Overview:  Recurrent DVT'sd lifelong Coumadin    ? Restless legs syndrome 6/29/2009    Overview:  Ongoing  problems , wakes  up  at [facility] . Hx  chronic  venous  insufficiency  and  recurrent DVT's  On Coumadin Life  long  Trial  Neurontin  300  at bedtime  to  start  the  2 at bedtime if  no better  then call  annd update  6/29/09    ? Shortness of breath     with exertion   ? Sinoatrial node dysfunction (H) 10/13/2014    Overview:  S/p [employer] Dual Chamber pacemaker, 10/27/2014    ? Sleep apnea     does not use CPAP   ? Vitamin D deficiency 11/9/2010    Overview:  VITAMIN D, 25-OH, TOT (ng/mL)  Date Value  7/31/2014 38.0   8/29/2013 24.0*  2/14/2013 33.4        Takes  2000 IU  daily Update low  push to 50, 00  weekly   x  4   weeks  / repeat  over 3  months   rechecked   8/29/23013   Increase  to  63894  weekly   and  recheck over   3  months          Surgical History:   Past Surgical History:   Procedure Laterality Date   ? AORTIC VALVE REPLACEMENT     ? bilateral cataract extractions     ? CARDIAC PACEMAKER PLACEMENT  2015    [employer]   ? FEMORAL ARTERY - POPLITEAL ARTERY BYPASS GRAFT Left    ? INCISION AND DRAINAGE OF WOUND Left 7/31/2019    Procedure: Incision and drainage left foot,  Exostectomy 2nd digit left foot,  Excisional Debridement ulceration 2nd digit left foot;  Surgeon: Matti Cooper DPM;  [place] [facility] [place]  Service: Podiatry   ? JOINT REPLACEMENT     ? RENAL BIOPSY     ? right carotid endarterectomy  2017   ? right TKA  2019   ? VEIN SURGERY          Medications:    Current Outpatient Medications:   ?  acetaminophen (TYLENOL) 500 MG tablet, Take 500 mg by mouth every 6 (six) hours as needed for pain., Disp: , Rfl:   ?  albuterol (PROAIR HFA;PROVENTIL HFA;VENTOLIN HFA) 90 mcg/actuation inhaler, Inhale 2  puffs every 6 (six) hours as needed for wheezing., Disp: , Rfl:   ?  allopurinol (ZYLOPRIM) 100 MG tablet, Take 100 mg by mouth daily.    , Disp: , Rfl:   ?  apixaban (ELIQUIS) 5 mg Tab tablet, Take 5 mg by mouth 2 (two) times a day.    , Disp: , Rfl:   ?  aspirin 81 MG EC tablet, Take 81 mg by mouth daily.    , Disp: , Rfl:   ?  bumetanide (BUMEX) 2 MG tablet, Take 2 mg by mouth daily as needed.    , Disp: , Rfl:   ?  clotrimazole-betamethasone (LOTRISONE) cream, WILLIE EXT AA BID PRN, Disp: , Rfl: 1  ?  ergocalciferol (ERGOCALCIFEROL) 50,000 unit capsule, Take 50,000 Units by mouth once a week.    , Disp: , Rfl:   ?  fluticasone propion-salmeterol (ADVAIR) 250-50 mcg/dose DISKUS, 1 puff by Other route., Disp: , Rfl:   ?  gabapentin (NEURONTIN) 100 MG capsule, Take 100 mg by mouth 2 (two) times a day. , Disp: , Rfl:   ?  gabapentin (NEURONTIN) 400 MG capsule, Take 400 mg by mouth., Disp: , Rfl:   ?  GLUC/MSM/C/BORON/MANGANESE/PRATIBHA (JOINT SUPPORT COMPLEX ORAL), Take 3 capsules by mouth once daily., Disp: , Rfl:   ?  HYDROcodone-acetaminophen 5-325 mg per tablet, Take 1 tablet by mouth every 4 (four) hours as needed., Disp: 30 tablet, Rfl: 0  ?  lisinopril (PRINIVIL,ZESTRIL) 10 MG tablet, Take 10 mg by mouth., Disp: , Rfl:   ?  metoprolol succinate (TOPROL-XL) 100 MG 24 hr tablet, Take 50 mg by mouth daily.    , Disp: , Rfl:   ?  montelukast (SINGULAIR) 10 mg tablet, Take 10 mg by mouth at bedtime.    , Disp: , Rfl:   ?  nitroglycerin (NITROSTAT) 0.4 MG SL tablet, Place 0.4 mg under the tongue every 5 (five) minutes as needed for chest pain.    , Disp: , Rfl:   ?  senna-docusate (PERICOLACE) 8.6-50 mg tablet, Take 2 tablets by mouth 2 (two) times a day as needed for constipation.    , Disp: , Rfl:   ?  silver sulfADIAZINE (SILVADENE) 1 % cream, Apply topically 2 (two) times a day as needed., Disp: 50 g, Rfl: 0  ?  walker (ULTRA-LIGHT ROLLATOR) Misc, Walker with front wheels for home use., Disp: , Rfl:   ?  collagenase  "(SANTYL) ointment, Apply qd, Disp: 15 g, Rfl: 2  ?  gentamicin (GARAMYCIN) 0.1 % ointment, Apply topically to leg wound twice per week, Disp: 30 g, Rfl: 0  ?  mupirocin (BACTROBAN) 2 % ointment, Apply topically to leg wound twice per week, Disp: 30 g, Rfl: 0  ?  triamcinolone (KENALOG) 0.5 % ointment, Apply to leg rash 5 grams twice per week, Disp: 30 g, Rfl: 0    Current Facility-Administered Medications:   ?  lidocaine 2 % jelly (XYLOCAINE), , Topical, PRN, Chidi Barry MD, 1 application at 20 1501  ?  lidocaine 2 % jelly (XYLOCAINE), , Topical, Once, Matti Cooper DPM    Allergies:   Allergies   Allergen Reactions   ? Celecoxib Unknown   ? Codeine      Altered mental status   ? Furosemide Myalgia   ? Latex Other (See Comments)     Tears skin off   ? Nsaids (Non-Steroidal Anti-Inflammatory Drug) Nausea And Vomiting   ? Opioids - Morphine Analogues Other (See Comments)   ? Tolmetin Other (See Comments)   ? Latex Other (See Comments)     Patient states it \"takes his skin off\"       Family history:   Family History   Problem Relation Age of Onset   ? Heart disease Mother    ? Heart disease Father     =    Social History:   Social History     Socioeconomic History   ? Marital status:      Spouse name: Not on file   ? Number of children: Not on file   ? Years of education: Not on file   ? Highest education level: Not on file   Occupational History   ? Not on file   Social Needs   ? Financial resource strain: Not on file   ? Food insecurity     Worry: Not on file     Inability: Not on file   ? Transportation needs     Medical: Not on file     Non-medical: Not on file   Tobacco Use   ? Smoking status: Former Smoker     Packs/day: 1.00     Years: 35.00     Pack years: 35.00     Last attempt to quit: 1977     Years since quittin.2   ? Smokeless tobacco: Never Used   Substance and Sexual Activity   ? Alcohol use: No     Comment: rare beer   ? Drug use: Not Currently   ? Sexual activity: Not " on file   Lifestyle   ? Physical activity     Days per week: Not on file     Minutes per session: Not on file   ? Stress: Not on file   Relationships   ? Social connections     Talks on phone: Not on file     Gets together: Not on file     Attends [employer] service: Not on file     Active member of club or organization: Not on file     Attends meetings of clubs or organizations: Not on file     Relationship status: Not on file   ? Intimate partner violence     Fear of current or ex partner: Not on file     Emotionally abused: Not on file     Physically abused: Not on file     Forced sexual activity: Not on file   Other Topics Concern   ? Not on file   Social History Narrative   ? Not on file        Physical Exam  Vitals: Blood pressure 142/74, pulse 60, temperature 98  F (36.7  C), resp. rate 18, weight (!) 251 lb (113.9 kg).  General: This is a 80 y.o. male who appears their reported age, not in acute distress  Head: normocephalic, Atraumatic; not wearing glasses; non-icteric sclera; no exudate; mild hearing loss   Respiratory: Clear throughout all lung fields; unlabored breathing; no cough   Cardiac: Regular, Rate and Rhythm, no murmurs appreciated   Skin: Uniformly warm and dry  Psych: Alert and oriented x4; calm and cooperative throughout exam  Abdomen: Normal bowel sounds. Soft, symmetric, no guarding or rigidity, nontender with palpation.  No organomegaly or masses palpated.     Extremities: right lateral ankle with multiple; full thickness irregular ulcerations; slough covered; moderate serosang drainage; there is extensive erythema surrounding the wound unclear if this is from drainage or allergy to the ssd; skin is inflamed; left shin with full thickness ulcer; moderate drainage; slough covered; +2 edema bilaterally;  strength testing revealed 4/4 to BLEs.    Sensation: Intact to pinprick and light touch throughout lower extremities bilaterally     Peripheral Vascular: abnormal dorsalis pedis, posterior  tibial pulses to bilateral feet , using a handheld doppler these were muted or absent and monophasic in nature.  Good capillary refill. No unusual venous distention. Positive for spider veins, telangiectasias, hemosiderin deposition or hyperpigmentation and fibrosis or scarring        Circumferential volume measures:    [facility] Edema 9/30/2020   Right just above MTP 23.7   Right Ankle 23   Right Widest Calf 41.3   Left - just above MTP 25.1   Left Ankle 22.5   Left Widest Calf 40.4       Ulceration(s)/Wound(s):     VASC Wound 09/09/20 right lateral foot/ankle (Active)   Pre Size Length 1.4 09/30/20 1400   Pre Size Width 1.4 09/30/20 1400   Pre Size Depth 0.1 09/30/20 1400   Pre Total Sq cm 1.96 09/30/20 1400   Prodcut Used Gauze 09/09/20 1614       VAS Wound 09/09/20 left lateral foot (Active)   Pre Size Length 1 09/30/20 1400   Pre Size Width 5 09/30/20 1400   Pre Size Depth 0.1 09/30/20 1400   Pre Total Sq cm 5 09/30/20 1400   Post Size Length 0.2 09/09/20 1614   Post Size Width 0.6 09/09/20 1614   Post Size Depth 0.1 09/09/20 1614   Post Total Sq cm 0.12 09/09/20 1614       VAS Wound 09/30/20 LT POTTS (Active)   Pre Size Length 1.6 09/30/20 1400   Pre Size Width 1.2 09/30/20 1400   Pre Size Depth 0.1 09/30/20 1400   Pre Total Sq cm 1.92 09/30/20 1400   Description ESCAR 09/30/20 1400       VASC Wound 09/30/20 LT SHIN DISTAL (Active)   Pre Size Length 0.5 09/30/20 1400   Pre Size Width 0.6 09/30/20 1400   Pre Total Sq cm 0.3 09/30/20 1400       VASC Wound 09/30/20 right LATERAL FOOT (Active)   Pre Size Length 0.5 09/30/20 1400   Pre Size Width 0.5 09/30/20 1400   Pre Total Sq cm 0.25 09/30/20 1400   Description CALLUS 09/30/20 1400       Wound 07/31/19 Non-pressure related ulcer Foot Right (Active)       Incision 07/31/19 Surgical Foot Left (Active)       Laboratory studies:   No results found for: SEDRATE  Lab Results   Component Value Date    CREATININE 2.04 (H) 06/12/2012     No results found for: HGBA1C  No  results found for: BUN  No results found for: LABPROT, LABALUM, ALBUMIN  No results found for: [id number]          Impression:   1. Phlebitis and thrombophlebitis of lower extremities  US Arterial Segmental Pressures Legs 3 or More Levels    US Venous Insufficiency Legs Bilateral    Ambulatory referral to Home Health    gentamicin (GARAMYCIN) 0.1 % ointment    mupirocin (BACTROBAN) 2 % ointment    triamcinolone (KENALOG) 0.5 % ointment    Change dressing    Culture/Gram Stain: Wound   2. Varicose veins of right lower extremity with ulcer of other part of lower leg with fat layer exposed (H)  US Arterial Segmental Pressures Legs 3 or More Levels    US Venous Insufficiency Legs Bilateral    Ambulatory referral to Home Health    gentamicin (GARAMYCIN) 0.1 % ointment    mupirocin (BACTROBAN) 2 % ointment    triamcinolone (KENALOG) 0.5 % ointment    Change dressing    Culture/Gram Stain: Wound   3. Venous stasis  US Arterial Segmental Pressures Legs 3 or More Levels    US Venous Insufficiency Legs Bilateral    Ambulatory referral to Home Health    gentamicin (GARAMYCIN) 0.1 % ointment    mupirocin (BACTROBAN) 2 % ointment    triamcinolone (KENALOG) 0.5 % ointment    Change dressing    Culture/Gram Stain: Wound   4. Venous stasis ulcer of ankle, left (H)  US Arterial Segmental Pressures Legs 3 or More Levels    US Venous Insufficiency Legs Bilateral    Ambulatory referral to Home Health    gentamicin (GARAMYCIN) 0.1 % ointment    mupirocin (BACTROBAN) 2 % ointment    triamcinolone (KENALOG) 0.5 % ointment    Change dressing    Culture/Gram Stain: Wound   5. Venous insufficiency of both lower extremities  US Arterial Segmental Pressures Legs 3 or More Levels    US Venous Insufficiency Legs Bilateral    Ambulatory referral to Home Health    gentamicin (GARAMYCIN) 0.1 % ointment    mupirocin (BACTROBAN) 2 % ointment    triamcinolone (KENALOG) 0.5 % ointment    Change dressing    Culture/Gram Stain: Wound   6. History of  DVT (deep vein thrombosis)  US Arterial Segmental Pressures Legs 3 or More Levels    US Venous Insufficiency Legs Bilateral    Ambulatory referral to Home Health    gentamicin (GARAMYCIN) 0.1 % ointment    mupirocin (BACTROBAN) 2 % ointment    triamcinolone (KENALOG) 0.5 % ointment    Change dressing    Culture/Gram Stain: Wound   7. Dependent edema  US Arterial Segmental Pressures Legs 3 or More Levels    US Venous Insufficiency Legs Bilateral    Ambulatory referral to Home Health    gentamicin (GARAMYCIN) 0.1 % ointment    mupirocin (BACTROBAN) 2 % ointment    triamcinolone (KENALOG) 0.5 % ointment    Change dressing    Culture/Gram Stain: Wound   8. Scar condition and fibrosis of skin  US Arterial Segmental Pressures Legs 3 or More Levels    US Venous Insufficiency Legs Bilateral    Ambulatory referral to Home Health    gentamicin (GARAMYCIN) 0.1 % ointment    mupirocin (BACTROBAN) 2 % ointment    triamcinolone (KENALOG) 0.5 % ointment    Change dressing    Culture/Gram Stain: Wound       9/30/2020 right lateral ankle        9/30/2020 left lateral foot    9/30/2020 left shin          Assessment/Plan:  1. Debridement: After discussion of risk factors and verbal consent was obtained 2% Lidocaine HCL jelly was applied, under clean conditions, the right lateral ankle and left shin ulceration(s) were debrided using currette. Devitalized and nonviable tissue, along with any fibrin and slough, was removed to improve granulation tissue formation, stimulate wound healing, decrease overall bacteria load, disrupt biofilm formation and decrease edge senescence.  Total excisional debridement was 9.18 sq cm from the epidermis/dermis area and into the subcutaneous tissue with a depth of 0.1 cm.   Ulcers were improved afterwards and .  Measures were unchanged after debridement.    2. Treatment: wound treatment will include irrigation and dressings to promote autolytic debridement which will include: due to erythema and  pain a vines culture was obtained from the right lateral foot I will wait for C&S results before prescribing oral antibiotics; but will start him on topical antimicrobials. Will start home care. Will have them come out twice per week and wash the wounds with dilute hibiclens; apply TCM to the inflammed skin on the right lateral ankle/foot region; then mix gent/bact ointments to the wounds; then silvercel. Due to the abnormal pulses on exam; leg pain; and historically abnormal ABIs 1 year ago; I will repeat these studies. Due to the vein issues in the past and varicosities on exam I will also obtain venous insufficiency u/s bilaterally. ADDENDUM: culture grew out staph will treat with doxycyline 100mg two times a day will have nurse call the pt to update    3. Edema. We spoke at length regarding their swelling, potential causes, and treatment options. Answered all questions. Their swelling is likely multifactorial including but not limited to the following: their weight, dependency of the limbs for most hours of the day, reduced activity with reduced calf pump mechanism, venous hypertension, valvular incompetence, high sodium diet, history of DVT, side effect of certain medications and history of joint replacements. I would like to first reduce their swelling down using 2 layer compression wraps and then transition them into compression garments; such as compression stockings or velcro wraps. The patient should continue to elevate their legs periodically throughout the day. Continue to take their diuretics per their PMD recommendations.       4. Offloading: avoid direct pressure on the wounds; does not have diabetes so will not qualify for shoes and insoles    5. Nutrition: focus on low sodium diet; focus on protein; handout provided    Patient to return to clinic in 4 week(s) for re-evaluation. They were instructed to call the clinic sooner with any further questions or concerns. Answered all  questions.              Mouna Gerard DNP, RN, CNP, CWOCN  [facility]  [phone]      This note was electronically signed by Mouna Gerard

## 2021-06-29 NOTE — PROGRESS NOTES
Progress Notes by Mouna Gerard NP at 10/28/2020  1:20 PM     Author: Mouna Gerard NP Service: -- Author Type: Nurse Practitioner    Filed: 10/28/2020  1:54 PM Encounter Date: 10/28/2020 Status: Signed    : Mouna Gerard NP (Nurse Practitioner)                   Follow up Vascular Visit       Date of Service:10/28/2020    Date Last Seen: 10/16/2020; 9/30/2020    Chief Complaint: BLE ulcers        Pt returns to [facility] with regards to their BLE ulcers and edema.  They arrive today alone. He was last seen 1 month ago. He reports that home care is considering discharging him on Friday he feels his wounds are healed. He reports the santyl ointment burned; this was discontiued.  They are currently using gent/bact; silvercel; gauze to the wounds. This is being done by home care twice per week. They are using compression stockings for compression. They are feeling well today. Denies fevers, chills. No shortness of breath. He reports that he was recently out shopping with his wife and felt dizzy; he sat down and passed out; he was brought to ED; stopped bumex and lisinopril; noted to have elevated bp today in clinic; he has follow up with PCP next week to discuss medications. Pt was never consistent with taking bumex in the past. He never got his EDGAR or venous studies done; states he was in the hospital when they were scheduled; upon further review these have been r/s several times 10/6 and 10/20; he was in the ER 10/19. He does not want home care; he would prefer his wife do the cares.     Allergies: Celecoxib, Codeine, Furosemide, Latex, Nsaids (non-steroidal anti-inflammatory drug), Opioids - morphine analogues, Tolmetin, and Latex    Medications:   Current Outpatient Medications:   ?  acetaminophen (TYLENOL) 500 MG tablet, Take 500 mg by mouth every 6 (six) hours as needed for pain., Disp: , Rfl:   ?  albuterol (PROAIR HFA;PROVENTIL HFA;VENTOLIN HFA) 90 mcg/actuation inhaler, Inhale  2 puffs every 6 (six) hours as needed for wheezing., Disp: , Rfl:   ?  allopurinol (ZYLOPRIM) 100 MG tablet, Take 100 mg by mouth daily.    , Disp: , Rfl:   ?  apixaban (ELIQUIS) 5 mg Tab tablet, Take 5 mg by mouth 2 (two) times a day.    , Disp: , Rfl:   ?  aspirin 81 MG EC tablet, Take 81 mg by mouth daily.    , Disp: , Rfl:   ?  clotrimazole-betamethasone (LOTRISONE) cream, WILLIE EXT AA BID PRN, Disp: , Rfl: 1  ?  collagenase (SANTYL) ointment, Apply qd, Disp: 15 g, Rfl: 2  ?  ergocalciferol (ERGOCALCIFEROL) 50,000 unit capsule, Take 50,000 Units by mouth once a week.    , Disp: , Rfl:   ?  fluticasone propion-salmeterol (ADVAIR) 250-50 mcg/dose DISKUS, 1 puff by Other route., Disp: , Rfl:   ?  gabapentin (NEURONTIN) 100 MG capsule, Take 100 mg by mouth 2 (two) times a day. , Disp: , Rfl:   ?  gabapentin (NEURONTIN) 400 MG capsule, Take 400 mg by mouth., Disp: , Rfl:   ?  GLUC/MSM/C/BORON/MANGANESE/PRATIHBA (JOINT SUPPORT COMPLEX ORAL), Take 3 capsules by mouth once daily., Disp: , Rfl:   ?  HYDROcodone-acetaminophen 5-325 mg per tablet, Take 1 tablet by mouth every 4 (four) hours as needed., Disp: 30 tablet, Rfl: 0  ?  metoprolol succinate (TOPROL-XL) 100 MG 24 hr tablet, Take 50 mg by mouth daily.    , Disp: , Rfl:   ?  montelukast (SINGULAIR) 10 mg tablet, Take 10 mg by mouth at bedtime.    , Disp: , Rfl:   ?  nitroglycerin (NITROSTAT) 0.4 MG SL tablet, Place 0.4 mg under the tongue every 5 (five) minutes as needed for chest pain.    , Disp: , Rfl:   ?  senna-docusate (PERICOLACE) 8.6-50 mg tablet, Take 2 tablets by mouth 2 (two) times a day as needed for constipation.    , Disp: , Rfl:   ?  silver sulfADIAZINE (SILVADENE) 1 % cream, Apply topically 2 (two) times a day as needed., Disp: 50 g, Rfl: 0  ?  triamcinolone (KENALOG) 0.5 % ointment, Apply to leg rash 5 grams twice per week, Disp: 30 g, Rfl: 0  ?  walker (ULTRA-LIGHT ROLLATOR) Misc, Walker with front wheels for home use., Disp: , Rfl:   ?  bumetanide (BUMEX)  2 MG tablet, Take 2 mg by mouth daily as needed.    , Disp: , Rfl:   ?  gentamicin (GARAMYCIN) 0.1 % ointment, Apply topically to leg wound twice per week, Disp: 30 g, Rfl: 0  ?  lisinopril (PRINIVIL,ZESTRIL) 10 MG tablet, Take 10 mg by mouth., Disp: , Rfl:   ?  mupirocin (BACTROBAN) 2 % ointment, Apply topically to leg wound twice per week, Disp: 30 g, Rfl: 0    Current Facility-Administered Medications:   ?  lidocaine 2 % jelly (XYLOCAINE), , Topical, PRN, Chidi Barry MD  ?  lidocaine 2 % jelly (XYLOCAINE), , Topical, Once, Matti Cooper DPM    History:   Past Medical History:   Diagnosis Date   ? Aortic stenosis    ? Aortic valve disorder 9/5/2014   ? Asthma 5/28/2015    Overview:  SIngulair controls , Advair  daily as needed    ? Atrial fibrillation (H) 10/13/2014   ? Cardiac pacemaker in situ 12/8/2014    Overview:  Date of last device in office evaluation: 02/19/15 ? Type of device: pacemaker  ?  and model: [employer]    * Date of implant: 10- ? Indication for device: tachy bladimir syndrome  ? Battery longevity documented as less than 3  Months: no ? Are any of the leads less than 3 months old: yes  ? Programming             ? Pacing mode and programmed lower rate:    ? Chronic kidney disease 9/5/2014    stage 3   ? Deep vein thrombosis (H)    ? Edema 8/28/2007    Overview:  Bumex to control , urinary functions/    ? Essential hypertension 5/28/2015    Overview:  Monopril, Bumex 2mg BID   POTASSIUM (mmol/L)  Date Value  7/31/2014 4.2   8/29/2013 4.3   10/22/2012 4.6     CREATININE (mg/dl)  Date Value  7/31/2014 1.66*  8/29/2013 1.53*  10/22/2012 1.55*     ? Essential tremor 8/28/2007    Overview:  Chronic to neck, hands 2003    ? Gout    ? History of blood clots     h/o DVT   ? Hypercoagulable state (H)    ? Memory impairment     per preop H&P   ? Mixed hyperlipidemia 5/28/2015    Overview:  Diet , fiber CV risks  Increased  LDL  Maximize  diet   recheck  over   3-4  months  cONSIDER  MEDS   LDL, CALC. (mg/dl)  Date Value  7/31/2014 125   8/29/2013 133*  10/22/2012 150*     ? Palpitations 9/25/2014   ? Peripheral vascular disease (H) 5/28/2015    Overview:  previous Fem/Pop bypass 1997    ? Phlebitis and thrombophlebitis of lower extremities 5/6/2006    Overview:  Recurrent DVT'sd lifelong Coumadin    ? Restless legs syndrome 6/29/2009    Overview:  Ongoing  problems , wakes  up  at [facility] . Hx  chronic  venous  insufficiency  and  recurrent DVT's  On Coumadin Life  long  Trial  Neurontin  300  at bedtime  to  start  the  2 at bedtime if  no better  then call  annd update  6/29/09    ? Shortness of breath     with exertion   ? Sinoatrial node dysfunction (H) 10/13/2014    Overview:  S/p [employer] Dual Chamber pacemaker, 10/27/2014    ? Sleep apnea     does not use CPAP   ? Vitamin D deficiency 11/9/2010    Overview:  VITAMIN D, 25-OH, TOT (ng/mL)  Date Value  7/31/2014 38.0   8/29/2013 24.0*  2/14/2013 33.4        Takes  2000 IU  daily Update low  push to 50, 00  weekly   x  4   weeks  / repeat  over 3  months   rechecked   8/29/23013   Increase  to  47640  weekly   and  recheck over   3  months         Physical Exam:    /80   Pulse 60   Temp 98.2  F (36.8  C)   Resp 20   Wt (!) 241 lb 4.8 oz (109.5 kg)   BMI 30.98 kg/m      General:  Patient presents to clinic in no apparent distress.  Head: normocephalic atraumatic  Psychiatric:  Alert and oriented x3.   Respiratory: unlabored breathing; no cough  Integumentary:  Skin is uniformly warm, dry and pink.    Wound #1 Location: right lateral foot  Size: 0.5L x 0.8W x 0.1depth.  No sinus tract present, Wound base: slough  No undermining present. Wound is full thickness. There is moderate drainage. Periwound: no denudement, erythema, induration, maceration or warmth.      Wound #2 Location: left shin Size: 0.7x0.4x x 0.1depth.  No sinus tract present, Wound base: slough  No undermining present. Wound is full  thickness. There is moderate drainage. Periwound: no denudement, erythema, induration, maceration or warmth.    Wound #3 Location: right lateral foot distal  Size:0.3x0.3 x 0.1depth.  No sinus tract present, Wound base: slough  No undermining present. Wound is full thickness. There is moderate drainage. Periwound: no denudement, erythema, induration, maceration or warmth.    Circumferential volume measures:    Vasc Edema 9/30/2020 10/28/2020   Right just above MTP 23.7 22.8   Right Ankle 23 21.5   Right Widest Calf 41.3 37.3   Left - just above MTP 25.1 24.2   Left Ankle 22.5 22.1   Left Widest Calf 40.4 37.1       Ulceration(s)/Wound(s):     VASC Wound 09/09/20 right lateral foot/ankle (Active)   Pre Size Length 0.5 10/28/20 1300   Pre Size Width 0.8 10/28/20 1300   Pre Size Depth 0.1 10/28/20 1300   Pre Total Sq cm 0.4 10/28/20 1300   Prodcut Used Gauze 09/09/20 1614       VASC Wound 09/09/20 left lateral foot (Active)   Pre Size Length 0 10/28/20 1300   Pre Size Width 0 10/28/20 1300   Pre Size Depth 0 10/28/20 1300   Pre Total Sq cm 0 10/28/20 1300   Post Size Length 0.2 09/09/20 1614   Post Size Width 0.6 09/09/20 1614   Post Size Depth 0.1 09/09/20 1614   Post Total Sq cm 0.12 09/09/20 1614       VASC Wound 09/30/20 LT POTTS (Active)   Pre Size Length 0.7 10/28/20 1300   Pre Size Width 0.4 10/28/20 1300   Pre Size Depth 0.1 10/28/20 1300   Pre Total Sq cm 0.28 10/28/20 1300   Description ESCAR 09/30/20 1400       VASC Wound 09/30/20 LT SHIN DISTAL (Active)   Pre Size Length 0 10/28/20 1300   Pre Size Width 0 10/28/20 1300   Pre Size Depth 0 10/28/20 1300   Pre Total Sq cm 0 10/28/20 1300       VASC Wound 09/30/20 right LATERAL FOOT (Active)   Pre Size Length 0.3 10/28/20 1300   Pre Size Width 0.3 10/28/20 1300   Pre Size Depth 0.1 10/28/20 1300   Pre Total Sq cm 0.09 10/28/20 1300   Description CALLUS 09/30/20 1400       Wound 07/31/19 Non-pressure related ulcer Foot Right (Active)       Incision 07/31/19  Surgical Foot Left (Active)        Lab Values    No results found for: SEDRATE  Lab Results   Component Value Date    CREATININE 2.04 (H) 06/12/2012     No results found for: HGBA1C  No results found for: BUN  No results found for: LABPROT, LABALUM, ALBUMIN  No results found for: [id number]          Impression:  1. Phlebitis and thrombophlebitis of lower extremities     2. Varicose veins of right lower extremity with ulcer of other part of lower leg with fat layer exposed (H)     3. Venous stasis     4. Venous stasis ulcer of ankle, left (H)     5. Venous insufficiency of both lower extremities     6. History of DVT (deep vein thrombosis)     7. Dependent edema     8. Scar condition and fibrosis of skin     9. Venous stasis ulcer of other part of right foot limited to breakdown of skin with varicose veins (H)         10/28/2020 right lateral foot         10/28/2020 BLE            Are any of these wounds new today: No; Location: na    Assessment/Plan:          1. Debridement: After discussion of risk factors and verbal consent was obtained 2% Lidocaine HCL jelly was applied, under clean conditions, the right lateral foot and left shin ulceration(s) were debrided using currette. Devitalized and nonviable tissue, along with any fibrin and slough, was removed to improve granulation tissue formation, stimulate wound healing, decrease overall bacteria load, disrupt biofilm formation and decrease edge senescence.  Total excisional debridement was 0.77 sq cm from the epidermis/dermis area and into the subcutaneous tissue with a depth of 0.1 cm.   Ulcers were improved afterwards and .  Measures were unchanged after debridement.       2.  Wound treatment: wound treatment will include irrigation and dressings to promote autolytic debridement which will include:pt was argumentative today; he felt his wounds were healed; he does not want home care; he does not like any of the ointments I have prescribed; he doesn't feel  he needs any of these or any dressings; explained the serious nature of the wounds that they are full thickness and not healed he will need to continue home care to keep a close eye on the wounds and monitor for infection; I did not prescribe the santyl; this burned him; we have not used that for a month; will continue the bact/gent ointment; silvercel; gauze; rolled gauze; change twice per week; he has dry scaling on his feet; recommend am lactin; he did not want to purchase this Improved wound are smaller and there are fewer wounds today           3. Edema: we were previously using 2 layer lite; he arrives today in well fitting stockings; will continue this. Explained that we need to update his EDGAR and venous studies to see if there is adequate blood flow for healing and or if the venous sytem is contributing to the wounds not healing; he did not feel he needed these; he has r/s twice already; will attempt to schedule again; if he chooses not to do these that is his right. The compression wraps were applied today in clinic. Stable            4. Nutrition: low sodium; high protein diet           5. Offloading: na           6. Elevated BP: recently passed out and his lisinopril and bumex were stopped; today in clinic his bp was elevated; he has follow up scheduled with his pcp next week to discuss restarting these.        Patient will follow up with me in 4 weeks for reevaluation. They were instructed to call the clinic sooner with any signs or symptoms of infection or any further questions/concerns. Answered all questions.          Mouna Gerard DNP, RN, CNP, CWOCN, CFCN, CLT  [facility]   [phone]        This note was electronically signed by Mouna Gerard

## 2021-06-29 NOTE — PROGRESS NOTES
Progress Notes by Matti Cooper DPM at 9/9/2020  3:20 PM     Author: Matti Cooper DPM Service: -- Author Type: Physician    Filed: 9/9/2020  4:33 PM Encounter Date: 9/9/2020 Status: Signed    : Matti Cooper DPM (Physician)       FOOT AND ANKLE SURGERY/PODIATRY Progress Note      ASSESSMENT:   Venous Stasis Ulceration right   Ulceration left foot       A new wound was identified today: yes,  it is located bilateral feet.  TREATMENT:  -The right ankle ulceration is stable, but does have fibrotic tissue. Underlying etiology related to venous stasis. We will begin santyl at this time. Left foot ulceration has a granular base, will begin endoform.     -After discussion of risk factors and consent obtained 2% Lidocaine HCL jelly was applied, under clean conditions, the bilateral and feet ulceration(s) were debrided using currette.  Devitalized and nonviable tissue, along with any fibrin and slough, was removed to improve granulation tissue formation, stimulate wound healing, decrease overall bacteria load, disrupt biofilm formation and decrease edge senescence.  Total excisional debridement was 14 sq cm into the subcutaneous tissue with a depth of 0.2 cm.   Ulcers were improved afterwards and .  Measures were as noted on the flow sheet. Patient tolerated this well. A gauze dressing was applied.     -I recommend the patient follow-up with Mouna Gerard for venous stasis related ulceration. He will follow-up with me q3 weeks until Dr. Gerard is available.    Matti Cooper DPM  [facility]        HPI: Shreyas Sanchez was seen today for new wounds on both feet. The patient believes he scrapped the right leg about one month ago. He is not sure when the left foot ulceration began. He does wear compression stockings but has not used them on the right ankle due to the open sore over the past month.      Past Medical History:   Diagnosis Date   ? Aortic stenosis    ? Aortic  valve disorder 9/5/2014   ? Asthma 5/28/2015    Overview:  SIngulair controls , Advair  daily as needed    ? Atrial fibrillation (H) 10/13/2014   ? Cardiac pacemaker in situ 12/8/2014    Overview:  Date of last device in office evaluation: 02/19/15 ? Type of device: pacemaker  ?  and model: [employer] [place]    * Date of implant: 10- ? Indication for device: tachy bladimir syndrome  ? Battery longevity documented as less than 3  Months: no ? Are any of the leads less than 3 months old: yes  ? Programming             ? Pacing mode and programmed lower rate:    ? Chronic kidney disease 9/5/2014    stage 3   ? Edema 8/28/2007    Overview:  Bumex to control , urinary functions/    ? Essential hypertension 5/28/2015    Overview:  Monopril, Bumex 2mg BID   POTASSIUM (mmol/L)  Date Value  7/31/2014 4.2   8/29/2013 4.3   10/22/2012 4.6     CREATININE (mg/dl)  Date Value  7/31/2014 1.66*  8/29/2013 1.53*  10/22/2012 1.55*     ? Essential tremor 8/28/2007    Overview:  Chronic to neck, hands 2003    ? Gout    ? History of blood clots     h/o DVT   ? Hypercoagulable state (H)    ? Memory impairment     per preop H&P   ? Mixed hyperlipidemia 5/28/2015    Overview:  Diet , fiber CV risks  Increased  LDL  Maximize  diet   recheck  over   3-4 months  cONSIDER  MEDS   LDL, CALC. (mg/dl)  Date Value  7/31/2014 125   8/29/2013 133*  10/22/2012 150*     ? Palpitations 9/25/2014   ? Peripheral vascular disease (H) 5/28/2015    Overview:  previous Fem/Pop bypass 1997    ? Phlebitis and thrombophlebitis of lower extremities 5/6/2006    Overview:  Recurrent DVT'sd lifelong Coumadin    ? Restless legs syndrome 6/29/2009    Overview:  Ongoing  problems , wakes  up  at [facility] . Hx  chronic  venous  insufficiency  and  recurrent DVT's  On Coumadin Life  long  Trial  Neurontin  300  at bedtime  to  start  the  2 at bedtime if  no better  then call  annd update  6/29/09    ? Shortness of breath     with exertion   ?  "Sinoatrial node dysfunction (H) 10/13/2014    Overview:  S/p [employer] Dual Chamber pacemaker, 10/27/2014    ? Sleep apnea     does not use CPAP   ? Vitamin D deficiency 11/9/2010    Overview:  VITAMIN D, 25-OH, TOT (ng/mL)  Date Value  7/31/2014 38.0   8/29/2013 24.0*  2/14/2013 33.4        Takes  2000 IU  daily Update low  push to 50, 00  weekly   x  4   weeks  / repeat  over 3  months   rechecked   8/29/23013   Increase  to  [id number]  weekly   and  recheck over   3  months         Past Surgical History:   Procedure Laterality Date   ? AORTIC VALVE REPLACEMENT     ? bilateral cataract extractions     ? CARDIAC PACEMAKER PLACEMENT  2015    [employer]   ? FEMORAL ARTERY - POPLITEAL ARTERY BYPASS GRAFT Left    ? INCISION AND DRAINAGE OF WOUND Left 7/31/2019    Procedure: Incision and drainage left foot,  Exostectomy 2nd digit left foot,  Excisional Debridement ulceration 2nd digit left foot;  Surgeon: Matti Cooper DPM;  Location: [facility];  Service: Podiatry   ? JOINT REPLACEMENT     ? RENAL BIOPSY     ? right carotid endarterectomy  2017   ? right TKA  2019       Allergies   Allergen Reactions   ? Celecoxib Unknown   ? Codeine      Altered mental status   ? Furosemide Myalgia   ? Latex Other (See Comments)     Tears skin off   ? Nsaids (Non-Steroidal Anti-Inflammatory Drug) Nausea And Vomiting   ? Opioids - Morphine Analogues Other (See Comments)   ? Tolmetin Other (See Comments)   ? Latex Other (See Comments)     Patient states it \"takes his skin off\"         Current Outpatient Medications:   ?  acetaminophen (TYLENOL) 500 MG tablet, Take 500 mg by mouth every 6 (six) hours as needed for pain., Disp: , Rfl:   ?  albuterol (PROAIR HFA;PROVENTIL HFA;VENTOLIN HFA) 90 mcg/actuation inhaler, Inhale 2 puffs every 6 (six) hours as needed for wheezing., Disp: , Rfl:   ?  allopurinol (ZYLOPRIM) 100 MG tablet, Take 100 mg by mouth daily.    , Disp: , Rfl:   ?  apixaban (ELIQUIS) 5 mg Tab tablet, Take 5 mg " by mouth 2 (two) times a day.    , Disp: , Rfl:   ?  aspirin 81 MG EC tablet, Take 81 mg by mouth daily.    , Disp: , Rfl:   ?  bumetanide (BUMEX) 2 MG tablet, Take 2 mg by mouth daily as needed.    , Disp: , Rfl:   ?  clotrimazole-betamethasone (LOTRISONE) cream, WILLIE EXT AA BID PRN, Disp: , Rfl: 1  ?  ergocalciferol (ERGOCALCIFEROL) 50,000 unit capsule, Take 50,000 Units by mouth once a week.    , Disp: , Rfl:   ?  fluticasone propion-salmeterol (ADVAIR) 250-50 mcg/dose DISKUS, 1 puff by Other route., Disp: , Rfl:   ?  gabapentin (NEURONTIN) 100 MG capsule, Take 100 mg by mouth 2 (two) times a day. Take with 300 mg capsule., Disp: , Rfl:   ?  gabapentin (NEURONTIN) 300 MG capsule, Take 300 mg by mouth 2 (two) times a day.    , Disp: , Rfl:   ?  GLUC/MSM/C/BORON/MANGANESE/PRATIBHA (JOINT SUPPORT COMPLEX ORAL), Take 3 capsules by mouth once daily., Disp: , Rfl:   ?  lisinopril (PRINIVIL,ZESTRIL) 10 MG tablet, Take 10 mg by mouth., Disp: , Rfl:   ?  metoprolol succinate (TOPROL-XL) 100 MG 24 hr tablet, Take 50 mg by mouth daily.    , Disp: , Rfl:   ?  montelukast (SINGULAIR) 10 mg tablet, Take 10 mg by mouth at bedtime.    , Disp: , Rfl:   ?  nitroglycerin (NITROSTAT) 0.4 MG SL tablet, Place 0.4 mg under the tongue every 5 (five) minutes as needed for chest pain.    , Disp: , Rfl:   ?  senna-docusate (PERICOLACE) 8.6-50 mg tablet, Take 2 tablets by mouth 2 (two) times a day as needed for constipation.    , Disp: , Rfl:   ?  silver sulfADIAZINE (SILVADENE) 1 % cream, Apply topically 2 (two) times a day as needed., Disp: 50 g, Rfl: 0  ?  walker (ULTRA-LIGHT ROLLATOR) Misc, Walker with front wheels for home use., Disp: , Rfl:   ?  HYDROcodone-acetaminophen 5-325 mg per tablet, Take 1 tablet by mouth every 4 (four) hours as needed., Disp: 30 tablet, Rfl: 0    Current Facility-Administered Medications:   ?  lidocaine 2 % jelly (XYLOCAINE), , Topical, PRN, Chidi Barry MD, 1 application at 09/09/20 1602  ?  lidocaine 2 %  jelly (XYLOCAINE), , Topical, Once, Matti Cooper, DPM    Review of Systems - 10 point Review of Systems is negative except for foot ulcers which is noted in HPI.      OBJECTIVE:  Vitals:    09/09/20 1554   BP: 138/64   Pulse: 60   Resp: 20   Temp: 98.3  F (36.8  C)     General appearance: Patient is alert and fully cooperative with history & exam.  No sign of distress is noted during the visit.   Vascular: Dorsalis pedis non-palpableBilateral.  Dermatologic:    VASC Wound 07/12/19 left 2nd toe (Active)   Pre Size Length 1 10/23/19 1200   Pre Size Width 0.5 10/23/19 1200   Pre Size Depth 0.1 10/23/19 1200   Pre Total Sq cm 0.5 10/23/19 1200   Undermined no 08/07/19 1400   Tunneling no 08/07/19 1400   Description slough 08/07/19 1400   Prodcut Used Gauze 08/07/19 1400       VASC Wound 09/09/20 right lateral foot/ankle (Active)   Pre Size Length 4 09/09/20 1614   Pre Size Width 3.1 09/09/20 1614   Pre Size Depth 0.1 09/09/20 1614   Pre Total Sq cm 12.4 09/09/20 1614   Prodcut Used Gauze 09/09/20 1614       VASC Wound 09/09/20 left lateral foot (Active)   Pre Size Length 0.3 09/09/20 1614   Pre Size Width 0.5 09/09/20 1614   Pre Size Depth 0.1 09/09/20 1614   Pre Total Sq cm 0.15 09/09/20 1614   Post Size Length 0.2 09/09/20 1614   Post Size Width 0.6 09/09/20 1614   Post Size Depth 0.1 09/09/20 1614   Post Total Sq cm 0.12 09/09/20 1614       Wound 07/31/19 Non-pressure related ulcer Foot Right (Active)       Incision 07/31/19 Surgical Foot Left (Active)   Fibrotic tissue lateral right leg/ankle ulcerations, with localized erythematous patches. Granular base left foot ulcer at 5th MPJ. No erythema bilateral.   Neurologic: Diminished to light touch Bilateral.  Musculoskeletal: Contracted digits noted Bilateral.    Imaging:     No results found.       Picture:

## 2021-07-03 NOTE — ADDENDUM NOTE
Addendum Note by Mouna Corral NP at 9/30/2020  2:40 PM     Author: Mouna Corral NP Service: -- Author Type: Nurse Practitioner    Filed: 10/5/2020 12:29 PM Encounter Date: 9/30/2020 Status: Signed    : Mouna Corral NP (Nurse Practitioner)    Addended by: MOUNA CORRAL on: 10/5/2020 12:29 PM        Modules accepted: Orders

## 2021-07-14 PROBLEM — L97.524 DIABETIC ULCER OF TOE OF LEFT FOOT ASSOCIATED WITH TYPE 2 DIABETES MELLITUS, WITH NECROSIS OF BONE (H): Status: RESOLVED | Noted: 2019-07-12 | Resolved: 2019-09-04

## 2021-07-14 PROBLEM — E11.621 DIABETIC ULCER OF TOE OF LEFT FOOT ASSOCIATED WITH TYPE 2 DIABETES MELLITUS, WITH NECROSIS OF BONE (H): Status: RESOLVED | Noted: 2019-07-12 | Resolved: 2019-09-04
